# Patient Record
Sex: FEMALE | Race: WHITE | NOT HISPANIC OR LATINO | Employment: UNEMPLOYED | URBAN - METROPOLITAN AREA
[De-identification: names, ages, dates, MRNs, and addresses within clinical notes are randomized per-mention and may not be internally consistent; named-entity substitution may affect disease eponyms.]

---

## 2019-04-08 DIAGNOSIS — Z34.81 ENCOUNTER FOR SUPERVISION OF OTHER NORMAL PREGNANCY IN FIRST TRIMESTER: Primary | ICD-10-CM

## 2019-04-10 ENCOUNTER — PROCEDURE VISIT (OUTPATIENT)
Dept: OBSTETRICS AND GYNECOLOGY | Facility: CLINIC | Age: 24
End: 2019-04-10
Payer: MEDICAID

## 2019-04-10 DIAGNOSIS — Z34.81 ENCOUNTER FOR SUPERVISION OF OTHER NORMAL PREGNANCY IN FIRST TRIMESTER: ICD-10-CM

## 2019-04-10 LAB
AMPHETAMINES (500): NEGATIVE NG/ML
BARBITURATES (200): NEGATIVE NG/ML
BENZODIAZEPINES (300): NEGATIVE NG/ML
COCAINE (150): NEGATIVE NG/ML
MARIJUANA, THC (50): NEGATIVE NG/ML
OPIATES (2000): NEGATIVE NG/ML
PH: 7.6 (ref 4.5–8)
PHENCYCLIDINE (25): NEGATIVE NG/ML
PROPOXYPHENE (300): NEGATIVE NG/ML
URINE CREATININE D/S: 128.2 MG/DL

## 2019-04-10 PROCEDURE — 76801 OB US < 14 WKS SINGLE FETUS: CPT | Mod: S$GLB,,, | Performed by: OBSTETRICS & GYNECOLOGY

## 2019-04-10 PROCEDURE — 76801 PR US, OB <14WKS, TRANSABD, SINGLE GESTATION: ICD-10-PCS | Mod: S$GLB,,, | Performed by: OBSTETRICS & GYNECOLOGY

## 2019-04-17 VITALS — HEIGHT: 60 IN

## 2019-04-18 ENCOUNTER — ROUTINE PRENATAL (OUTPATIENT)
Dept: OBSTETRICS AND GYNECOLOGY | Facility: CLINIC | Age: 24
End: 2019-04-18
Payer: MEDICAID

## 2019-04-18 VITALS
DIASTOLIC BLOOD PRESSURE: 67 MMHG | SYSTOLIC BLOOD PRESSURE: 103 MMHG | WEIGHT: 149 LBS | BODY MASS INDEX: 29.1 KG/M2 | HEART RATE: 73 BPM

## 2019-04-18 DIAGNOSIS — Z34.81 ENCOUNTER FOR SUPERVISION OF OTHER NORMAL PREGNANCY IN FIRST TRIMESTER: ICD-10-CM

## 2019-04-18 DIAGNOSIS — R82.79 ABNORMAL FINDINGS ON MICROBIOLOGICAL EXAMINATION OF URINE: Primary | ICD-10-CM

## 2019-04-18 PROCEDURE — 99204 PR OFFICE/OUTPT VISIT, NEW, LEVL IV, 45-59 MIN: ICD-10-PCS | Mod: TH,S$GLB,, | Performed by: OBSTETRICS & GYNECOLOGY

## 2019-04-18 PROCEDURE — 99204 OFFICE O/P NEW MOD 45 MIN: CPT | Mod: TH,S$GLB,, | Performed by: OBSTETRICS & GYNECOLOGY

## 2019-04-18 NOTE — PROGRESS NOTES
Subjective:       Patient ID: Ricarda Daugherty is a 24 y.o.  at 9w3d   Chief Complaint:  Initial Prenatal Visit      History of Present Illness  here for new ob exam.  Labs and history were reviewed with the patient today  No complaints      OB History  OB History    Para Term  AB Living   3 2 2     2   SAB TAB Ectopic Multiple Live Births           2      # Outcome Date GA Lbr Abilio/2nd Weight Sex Delivery Anes PTL Lv   3 Current            2 Term 18 39w2d  3.374 kg (7 lb 7 oz) F    MARICARMEN   1 Term    3.204 kg (7 lb 1 oz) M Vag-Spont   MARICARMEN       Review of Systems  nml 1st trimester sx- sob, dec excercixe tolerance, fatigue and nausea  Neg for vag bleed, dc, vomiting, cp, lof, fever, chills, ns, visual changes, swelling, headaches, constipation/diarrhea, dysuria, freq/urgency of urination     Objective:     Vitals:    19 1518   BP: 103/67   Pulse: 73       NAD  NCAT  pupils normal size  Skin nml no rashes or lesions  No resp distress, resp even and unlabored  nt nd, no rebound no guarding  nml ext fem gent, normal cvx uterus and adnexa, no dc or bleeding  nml appearing rectum  No cyanosis or clubbing, edema appropriate for pregn    Uterus size approp for gest age         Assessment:        1. Abnormal findings on microbiological examination of urine    2. Encounter for supervision of other normal pregnancy in first trimester               Plan:      Abnormal findings on microbiological examination of urine  -     Urine culture    Encounter for supervision of other normal pregnancy in first trimester  -     Urine culture  -     Type & Screen; Future; Expected date: 2019  -     HIV 1/2 Ag/Ab (4th Gen); Future; Expected date: 2019  -     Treponema pallidum (syphillis) antibody; Future; Expected date: 2019  -     Hepatitis B surface antigen; Future; Expected date: 2019  -     CBC auto differential; Future; Expected date: 2019  -     RUBELLA ANTIBODY SCREEN;  Future; Expected date: 04/18/2019         PNLs today   Pain fever bleeding precautions  Encouraged PNV  rtc 4 wks

## 2019-04-19 LAB — URINE CULTURE, ROUTINE: NORMAL

## 2019-04-25 LAB
CHLAMYDIA: POSITIVE
GONORRHEA: NEGATIVE
SOURCE: ABNORMAL
SOURCE: NORMAL
TRICHOMONAS AMPLIFIED: NEGATIVE

## 2019-05-06 DIAGNOSIS — A74.9 CHLAMYDIA INFECTION: Primary | ICD-10-CM

## 2019-05-06 NOTE — TELEPHONE ENCOUNTER
Pt notified of results and need for partner treatment. Pt verbalizes understanding. Script sent to pharm.

## 2019-05-06 NOTE — TELEPHONE ENCOUNTER
----- Message from Pallavi Tafoya MD sent at 5/2/2019  4:28 PM CDT -----  Please call the patient regarding her abnormal result. zithromax 1g PO once

## 2019-05-07 RX ORDER — AZITHROMYCIN 500 MG/1
TABLET, FILM COATED ORAL
Qty: 2 TABLET | Refills: 0 | Status: SHIPPED | OUTPATIENT
Start: 2019-05-07 | End: 2019-07-22 | Stop reason: SDUPTHER

## 2019-05-24 ENCOUNTER — ROUTINE PRENATAL (OUTPATIENT)
Dept: OBSTETRICS AND GYNECOLOGY | Facility: CLINIC | Age: 24
End: 2019-05-24
Payer: MEDICAID

## 2019-05-24 VITALS
HEART RATE: 67 BPM | SYSTOLIC BLOOD PRESSURE: 108 MMHG | WEIGHT: 150 LBS | DIASTOLIC BLOOD PRESSURE: 77 MMHG | BODY MASS INDEX: 29.29 KG/M2

## 2019-05-24 DIAGNOSIS — Z34.82 ENCOUNTER FOR SUPERVISION OF OTHER NORMAL PREGNANCY IN SECOND TRIMESTER: Primary | ICD-10-CM

## 2019-05-24 PROCEDURE — 99213 PR OFFICE/OUTPT VISIT, EST, LEVL III, 20-29 MIN: ICD-10-PCS | Mod: TH,S$GLB,, | Performed by: OBSTETRICS & GYNECOLOGY

## 2019-05-24 PROCEDURE — 99213 OFFICE O/P EST LOW 20 MIN: CPT | Mod: TH,S$GLB,, | Performed by: OBSTETRICS & GYNECOLOGY

## 2019-05-24 NOTE — PROGRESS NOTES
Subjective:       Patient ID: Ricarda Daugherty is a 24 y.o.  at 14w4d      Chief Complaint:  Routine Prenatal Visit      History of Present Illness  No complaints. Labs and history reviewed with pt.       Review of Systems  Denies n/v, f/c, dysuria, contractions,   VD, VB, round ligament pain, headaches       Objective:     Vitals:    19 1149   BP: 108/77   Pulse: 67     Wt Readings from Last 3 Encounters:   19 68 kg (150 lb)   19 67.6 kg (149 lb)        nad  NCAT  pupils normal size  Skin nml no rashes or lesions  No resp distress, resp even and unlabored  Gravid nt, no rebound no guarding  No cyanosis or clubbing, edema appropriate for pregn    FHT: 140s         Assessment:      No diagnosis found.            Plan:         Prenatal testing - quad on RTC   Encouraged PNV  Pain, fever, bleeding precautions   RTC 4 weeks

## 2019-06-21 ENCOUNTER — ROUTINE PRENATAL (OUTPATIENT)
Dept: OBSTETRICS AND GYNECOLOGY | Facility: CLINIC | Age: 24
End: 2019-06-21
Payer: MEDICAID

## 2019-06-21 VITALS
HEART RATE: 80 BPM | SYSTOLIC BLOOD PRESSURE: 113 MMHG | BODY MASS INDEX: 29.29 KG/M2 | WEIGHT: 150 LBS | DIASTOLIC BLOOD PRESSURE: 72 MMHG

## 2019-06-21 DIAGNOSIS — Z34.82 ENCOUNTER FOR SUPERVISION OF OTHER NORMAL PREGNANCY IN SECOND TRIMESTER: Primary | ICD-10-CM

## 2019-06-21 PROCEDURE — 99213 PR OFFICE/OUTPT VISIT, EST, LEVL III, 20-29 MIN: ICD-10-PCS | Mod: TH,S$GLB,, | Performed by: OBSTETRICS & GYNECOLOGY

## 2019-06-21 PROCEDURE — 99213 OFFICE O/P EST LOW 20 MIN: CPT | Mod: TH,S$GLB,, | Performed by: OBSTETRICS & GYNECOLOGY

## 2019-06-21 NOTE — PROGRESS NOTES
Subjective:       Patient ID: Ricarda Daugherty is a 24 y.o.  at 18w4d      Chief Complaint:  Routine Prenatal Visit      History of Present Illness  No complaints. Labs and history reviewed with pt.       Review of Systems  Denies n/v, f/c, dysuria, contractions,   VD, VB, round ligament pain, headaches       Objective:     Vitals:    19 1026   BP: 113/72   Pulse: 80     Wt Readings from Last 3 Encounters:   19 68 kg (150 lb)   19 68 kg (150 lb)   19 67.6 kg (149 lb)        nad  NCAT  pupils normal size  Skin nml no rashes or lesions  No resp distress, resp even and unlabored  Gravid nt, no rebound no guarding  No cyanosis or clubbing, edema appropriate for pregn    FHT: 140s         Assessment:      No diagnosis found.            Plan:         Quad today   Encouraged PNV  Pain, fever, bleeding precautions   RTC 4 weeks

## 2019-06-24 LAB
# FETUSES US: NORMAL
AFP MOM: 0.73
AFP SERPL-MCNC: 34 NG/ML
AGE: 23.9 YR
CALC'D GESTATIONAL AGE: NORMAL
ESTIMATED DUE DATE: NORMAL
ESTRIOL MOM: 1.33
ESTRIOL: 2.2 NG/ML
FAMILY HISTORY OF ANEUPLOIDY: NO
FAMILY HISTORY OF NTD: NO
GA METHOD: NORMAL
HCG SERPL-ACNC: NORMAL IU/L
IDDM: NO
INHIBIN A SERPL-MCNC: 243 PG/ML
INTEGRATED SCN PATIENT-IMP: NORMAL
Lab: 1.8
M.O.M. INHIBIN A: 1.52
MOTHER'S RACE: NORMAL
REPORT: NORMAL
SMOKER?: NO
SPECIMEN DRAWN SERPL: NORMAL
WEIGHT: NORMAL

## 2019-07-19 ENCOUNTER — ROUTINE PRENATAL (OUTPATIENT)
Dept: OBSTETRICS AND GYNECOLOGY | Facility: CLINIC | Age: 24
End: 2019-07-19
Payer: MEDICAID

## 2019-07-19 VITALS
DIASTOLIC BLOOD PRESSURE: 71 MMHG | SYSTOLIC BLOOD PRESSURE: 108 MMHG | WEIGHT: 156.81 LBS | BODY MASS INDEX: 30.62 KG/M2 | HEART RATE: 89 BPM

## 2019-07-19 DIAGNOSIS — Z34.82 ENCOUNTER FOR SUPERVISION OF OTHER NORMAL PREGNANCY IN SECOND TRIMESTER: Primary | ICD-10-CM

## 2019-07-19 NOTE — PROGRESS NOTES
Subjective:       Patient ID: Ricarda Daugherty is a 24 y.o.  at 22w4d      Chief Complaint:  F/u OB     History of Present Illness  No complaints. Labs and history reviewed with pt.       Review of Systems  Denies n/v, f/c, dysuria, contractions,   VD, VB, round ligament pain, headaches       Objective:     Vitals:    19 0950   BP: 108/71   Pulse: 89     Wt Readings from Last 3 Encounters:   19 71.1 kg (156 lb 12.8 oz)   19 68 kg (150 lb)   19 68 kg (150 lb)        nad  NCAT  pupils normal size  Skin nml no rashes or lesions  No resp distress, resp even and unlabored  Gravid nt, no rebound no guarding  No cyanosis or clubbing, edema appropriate for pregn    FHT: 140s         Assessment:      No diagnosis found.            Plan:         Anatomy today WNL   Encouraged PNV  Pain, fever, bleeding precautions   RTC 4 weeks

## 2019-07-22 DIAGNOSIS — A74.9 CHLAMYDIA INFECTION: ICD-10-CM

## 2019-07-22 DIAGNOSIS — A74.9 CHLAMYDIA INFECTION: Primary | ICD-10-CM

## 2019-07-22 RX ORDER — AZITHROMYCIN 500 MG/1
TABLET, FILM COATED ORAL
Qty: 4 TABLET | Refills: 0 | Status: SHIPPED | OUTPATIENT
Start: 2019-07-22 | End: 2021-01-12

## 2019-07-23 DIAGNOSIS — A74.9 CHLAMYDIA INFECTION: ICD-10-CM

## 2019-07-23 RX ORDER — AZITHROMYCIN 500 MG/1
TABLET, FILM COATED ORAL
Qty: 4 TABLET | Refills: 0 | Status: CANCELLED | OUTPATIENT
Start: 2019-07-23

## 2019-08-09 ENCOUNTER — ROUTINE PRENATAL (OUTPATIENT)
Dept: OBSTETRICS AND GYNECOLOGY | Facility: CLINIC | Age: 24
End: 2019-08-09
Payer: MEDICAID

## 2019-08-09 VITALS
BODY MASS INDEX: 30.27 KG/M2 | WEIGHT: 155 LBS | HEART RATE: 85 BPM | SYSTOLIC BLOOD PRESSURE: 112 MMHG | DIASTOLIC BLOOD PRESSURE: 74 MMHG

## 2019-08-09 DIAGNOSIS — Z34.82 ENCOUNTER FOR SUPERVISION OF OTHER NORMAL PREGNANCY IN SECOND TRIMESTER: Primary | ICD-10-CM

## 2019-08-09 PROCEDURE — 99213 OFFICE O/P EST LOW 20 MIN: CPT | Mod: TH,S$GLB,, | Performed by: OBSTETRICS & GYNECOLOGY

## 2019-08-09 PROCEDURE — 99213 PR OFFICE/OUTPT VISIT, EST, LEVL III, 20-29 MIN: ICD-10-PCS | Mod: TH,S$GLB,, | Performed by: OBSTETRICS & GYNECOLOGY

## 2019-08-09 NOTE — PROGRESS NOTES
Subjective:       Patient ID: Ricarda Daugherty is a 24 y.o.  at 25w4d      Chief Complaint:  F/u OB     History of Present Illness  No complaints. Labs and history reviewed with pt.       Review of Systems  Denies n/v, f/c, dysuria, contractions,   VD, VB, round ligament pain, headaches       Objective:     Vitals:    19 1023   BP: 112/74   Pulse: 85     Wt Readings from Last 3 Encounters:   19 70.3 kg (155 lb)   19 71.1 kg (156 lb 12.8 oz)   19 68 kg (150 lb)        nad  NCAT  pupils normal size  Skin nml no rashes or lesions  No resp distress, resp even and unlabored  Gravid nt, no rebound no guarding  No cyanosis or clubbing, edema appropriate for pregn    FHT: 140s         Assessment:        1. Encounter for supervision of other normal pregnancy in second trimester                Plan:           Encouraged PNV  Pain, fever, bleeding precautions   RTC 4 weeks

## 2019-09-09 ENCOUNTER — ROUTINE PRENATAL (OUTPATIENT)
Dept: OBSTETRICS AND GYNECOLOGY | Facility: CLINIC | Age: 24
End: 2019-09-09
Payer: MEDICAID

## 2019-09-09 VITALS — DIASTOLIC BLOOD PRESSURE: 73 MMHG | BODY MASS INDEX: 31.25 KG/M2 | SYSTOLIC BLOOD PRESSURE: 107 MMHG | WEIGHT: 160 LBS

## 2019-09-09 DIAGNOSIS — Z34.83 ENCOUNTER FOR SUPERVISION OF OTHER NORMAL PREGNANCY IN THIRD TRIMESTER: Primary | ICD-10-CM

## 2019-09-09 DIAGNOSIS — Z34.82 ENCOUNTER FOR SUPERVISION OF OTHER NORMAL PREGNANCY IN SECOND TRIMESTER: Primary | ICD-10-CM

## 2019-09-09 DIAGNOSIS — O26.843 UTERINE SIZE-DATE DISCREPANCY IN THIRD TRIMESTER: ICD-10-CM

## 2019-09-09 PROCEDURE — 90715 TDAP VACCINE GREATER THAN OR EQUAL TO 7YO IM: ICD-10-PCS | Mod: SL,S$GLB,ICN, | Performed by: OBSTETRICS & GYNECOLOGY

## 2019-09-09 PROCEDURE — 99213 PR OFFICE/OUTPT VISIT, EST, LEVL III, 20-29 MIN: ICD-10-PCS | Mod: TH,25,S$GLB, | Performed by: OBSTETRICS & GYNECOLOGY

## 2019-09-09 PROCEDURE — 90471 PR IMMUNIZ ADMIN,1 SINGLE/COMB VAC/TOXOID: ICD-10-PCS | Mod: S$GLB,ICN,, | Performed by: OBSTETRICS & GYNECOLOGY

## 2019-09-09 PROCEDURE — 76815 PR  US,PREGNANT UTERUS,LIMITED, 1/> FETUSES: ICD-10-PCS | Mod: S$GLB,,, | Performed by: OBSTETRICS & GYNECOLOGY

## 2019-09-09 PROCEDURE — 76815 OB US LIMITED FETUS(S): CPT | Mod: S$GLB,,, | Performed by: OBSTETRICS & GYNECOLOGY

## 2019-09-09 PROCEDURE — 90471 IMMUNIZATION ADMIN: CPT | Mod: S$GLB,ICN,, | Performed by: OBSTETRICS & GYNECOLOGY

## 2019-09-09 PROCEDURE — 99213 OFFICE O/P EST LOW 20 MIN: CPT | Mod: TH,25,S$GLB, | Performed by: OBSTETRICS & GYNECOLOGY

## 2019-09-09 PROCEDURE — 90715 TDAP VACCINE 7 YRS/> IM: CPT | Mod: SL,S$GLB,ICN, | Performed by: OBSTETRICS & GYNECOLOGY

## 2019-09-09 NOTE — PROGRESS NOTES
Subjective:       Patient ID: Ricarda Daugherty is a 24 y.o.  at 30w0d      Chief Complaint:  F/u OB     History of Present Illness  No complaints. Labs and history reviewed with pt.       Review of Systems  Denies n/v, f/c, dysuria, contractions,   VD, VB, round ligament pain, headaches       Objective:     Vitals:    19 1415   BP: 107/73     Wt Readings from Last 3 Encounters:   19 72.6 kg (160 lb)   19 70.3 kg (155 lb)   19 71.1 kg (156 lb 12.8 oz)        nad  NCAT  pupils normal size  Skin nml no rashes or lesions  No resp distress, resp even and unlabored  Gravid nt, no rebound no guarding  No cyanosis or clubbing, edema appropriate for pregn    FHT: 140s  FH: 26cm        Assessment:        1. Encounter for supervision of other normal pregnancy in second trimester                Plan:      Growth today - 1357g 26%  Tdap, 3rd tri, o'sul today   Encouraged PNV  Pain, fever, bleeding precautions   RTC 4 weeks

## 2019-09-10 LAB — GLUCOSE 1 HR POST 50 GM: 101 MG/DL

## 2019-09-24 ENCOUNTER — ROUTINE PRENATAL (OUTPATIENT)
Dept: OBSTETRICS AND GYNECOLOGY | Facility: CLINIC | Age: 24
End: 2019-09-24
Payer: MEDICAID

## 2019-09-24 VITALS
SYSTOLIC BLOOD PRESSURE: 101 MMHG | HEART RATE: 91 BPM | BODY MASS INDEX: 31.44 KG/M2 | DIASTOLIC BLOOD PRESSURE: 64 MMHG | WEIGHT: 161 LBS

## 2019-09-24 DIAGNOSIS — Z34.83 ENCOUNTER FOR SUPERVISION OF OTHER NORMAL PREGNANCY IN THIRD TRIMESTER: Primary | ICD-10-CM

## 2019-09-24 PROCEDURE — 99213 PR OFFICE/OUTPT VISIT, EST, LEVL III, 20-29 MIN: ICD-10-PCS | Mod: TH,S$GLB,, | Performed by: OBSTETRICS & GYNECOLOGY

## 2019-09-24 PROCEDURE — 99213 OFFICE O/P EST LOW 20 MIN: CPT | Mod: TH,S$GLB,, | Performed by: OBSTETRICS & GYNECOLOGY

## 2019-09-24 NOTE — PROGRESS NOTES
Subjective:       Patient ID: Ricarda Daugherty is a 24 y.o.  at 32w1d      Chief Complaint:  F/u OB     History of Present Illness  No complaints. Labs and history reviewed with pt.       Review of Systems  Denies n/v, f/c, dysuria, contractions,   VD, VB, round ligament pain, headaches       Objective:     Vitals:    19 1101   BP: 101/64   Pulse: 91     Wt Readings from Last 3 Encounters:   19 73 kg (161 lb)   19 72.6 kg (160 lb)   19 70.3 kg (155 lb)        nad  NCAT  pupils normal size  Skin nml no rashes or lesions  No resp distress, resp even and unlabored  Gravid nt, no rebound no guarding  No cyanosis or clubbing, edema appropriate for pregn    FHT: 140s  FH: 30cm        Assessment:        1. Encounter for supervision of other normal pregnancy in third trimester                Plan:        Encouraged PNV  Pain, fever, bleeding precautions   RTC 4 weeks

## 2019-09-30 LAB
ABS NRBC COUNT: 0 X 10 3/UL (ref 0–0.01)
ABSOLUTE BASOPHIL: 0.01 X 10 3/UL (ref 0–0.22)
ABSOLUTE EOSINOPHIL: 0.2 X 10 3/UL (ref 0.04–0.54)
ABSOLUTE IMMATURE GRAN: 0.02 X 10 3/UL (ref 0–0.04)
ABSOLUTE LYMPHOCYTE: 1.04 X 10 3/UL (ref 0.86–4.75)
ABSOLUTE MONOCYTE: 0.5 X 10 3/UL (ref 0.22–1.08)
BASOPHILS NFR BLD: 0.2 % (ref 0.2–1.2)
EOSINOPHIL NFR BLD: 3.1 % (ref 0.7–7)
HCT VFR BLD AUTO: 31.5 % (ref 37–47)
HGB BLD-MCNC: 9.7 G/DL (ref 12–16)
IMMATURE GRANULOCYTES: 0.3 % (ref 0–0.5)
LYMPHOCYTES NFR BLD: 16 % (ref 19.3–53.1)
MCH RBC QN AUTO: 25.7 PG (ref 27–32)
MCHC RBC AUTO-ENTMCNC: 30.8 G/DL (ref 32–36)
MCV RBC AUTO: 83.3 FL (ref 82–100)
MONOCYTES NFR BLD: 7.7 % (ref 4.7–12.5)
NEUTROPHILS ABSOLUTE COUNT: 4.71 X 10 3/UL (ref 2.15–7.56)
NEUTROPHILS NFR BLD: 72.7 %
NUCLEATED RED BLOOD CELLS: 0 /100 WBC (ref 0–0.2)
PLATELET # BLD AUTO: 261 X 10 3/UL (ref 135–400)
RBC # BLD AUTO: 3.78 X 10 6/UL (ref 4.2–5.4)
RDW-SD: 43.4 FL (ref 37–54)
WBC # BLD: 6.48 X 10 3/UL (ref 4.3–10.8)

## 2019-10-03 DIAGNOSIS — O99.013 ANEMIA DURING PREGNANCY IN THIRD TRIMESTER: Primary | ICD-10-CM

## 2019-10-03 RX ORDER — FERROUS SULFATE 325(65) MG
325 TABLET, DELAYED RELEASE (ENTERIC COATED) ORAL 3 TIMES DAILY
Qty: 90 TABLET | Refills: 3 | Status: SHIPPED | OUTPATIENT
Start: 2019-10-03 | End: 2020-01-31

## 2019-10-03 RX ORDER — ASCORBIC ACID 500 MG
500 TABLET ORAL 2 TIMES DAILY
Qty: 60 TABLET | Refills: 3 | Status: SHIPPED | OUTPATIENT
Start: 2019-10-03 | End: 2020-01-31

## 2019-10-03 NOTE — TELEPHONE ENCOUNTER
----- Message from Pallavi Tafoya MD sent at 10/2/2019  9:19 AM CDT -----  Please call the patient regarding her abnormal result. Call in Iron TID, vit C BID

## 2019-10-08 ENCOUNTER — ROUTINE PRENATAL (OUTPATIENT)
Dept: OBSTETRICS AND GYNECOLOGY | Facility: CLINIC | Age: 24
End: 2019-10-08
Payer: MEDICAID

## 2019-10-08 VITALS
DIASTOLIC BLOOD PRESSURE: 70 MMHG | HEART RATE: 93 BPM | WEIGHT: 160.38 LBS | SYSTOLIC BLOOD PRESSURE: 122 MMHG | BODY MASS INDEX: 31.33 KG/M2

## 2019-10-08 DIAGNOSIS — Z34.83 ENCOUNTER FOR SUPERVISION OF OTHER NORMAL PREGNANCY IN THIRD TRIMESTER: Primary | ICD-10-CM

## 2019-10-08 PROCEDURE — 90471 IMMUNIZATION ADMIN: CPT | Mod: S$GLB,VFC,, | Performed by: OBSTETRICS & GYNECOLOGY

## 2019-10-08 PROCEDURE — 99213 OFFICE O/P EST LOW 20 MIN: CPT | Mod: 25,TH,S$GLB, | Performed by: OBSTETRICS & GYNECOLOGY

## 2019-10-08 PROCEDURE — 90686 FLU VACCINE (QUAD) GREATER THAN OR EQUAL TO 3YO PRESERVATIVE FREE IM: ICD-10-PCS | Mod: SL,S$GLB,, | Performed by: OBSTETRICS & GYNECOLOGY

## 2019-10-08 PROCEDURE — 90686 IIV4 VACC NO PRSV 0.5 ML IM: CPT | Mod: SL,S$GLB,, | Performed by: OBSTETRICS & GYNECOLOGY

## 2019-10-08 PROCEDURE — 99213 PR OFFICE/OUTPT VISIT, EST, LEVL III, 20-29 MIN: ICD-10-PCS | Mod: 25,TH,S$GLB, | Performed by: OBSTETRICS & GYNECOLOGY

## 2019-10-08 PROCEDURE — 90471 FLU VACCINE (QUAD) GREATER THAN OR EQUAL TO 3YO PRESERVATIVE FREE IM: ICD-10-PCS | Mod: S$GLB,VFC,, | Performed by: OBSTETRICS & GYNECOLOGY

## 2019-10-08 NOTE — PROGRESS NOTES
Subjective:       Patient ID: Ricarda Daugherty is a 24 y.o.  at 34w1d      Chief Complaint:  F/u OB     History of Present Illness  No complaints. Labs and history reviewed with pt.       Review of Systems  Denies n/v, f/c, dysuria, contractions,   VD, VB, round ligament pain, headaches       Objective:     Vitals:    10/08/19 1122   BP: 122/70   Pulse: 93     Wt Readings from Last 3 Encounters:   10/08/19 72.8 kg (160 lb 6.4 oz)   19 73 kg (161 lb)   19 72.6 kg (160 lb)        nad  NCAT  pupils normal size  Skin nml no rashes or lesions  No resp distress, resp even and unlabored  Gravid nt, no rebound no guarding  No cyanosis or clubbing, edema appropriate for pregn    FHT: 140s  FH: 30cm        Assessment:        1. Encounter for supervision of other normal pregnancy in third trimester                Plan:      Flu shot today   Encouraged PNV  Pain, fever, bleeding precautions   RTC 2 weeks

## 2019-10-24 ENCOUNTER — ROUTINE PRENATAL (OUTPATIENT)
Dept: OBSTETRICS AND GYNECOLOGY | Facility: CLINIC | Age: 24
End: 2019-10-24
Payer: MEDICAID

## 2019-10-24 VITALS
HEART RATE: 82 BPM | DIASTOLIC BLOOD PRESSURE: 75 MMHG | SYSTOLIC BLOOD PRESSURE: 115 MMHG | WEIGHT: 164 LBS | BODY MASS INDEX: 32.03 KG/M2

## 2019-10-24 DIAGNOSIS — Z34.83 ENCOUNTER FOR SUPERVISION OF OTHER NORMAL PREGNANCY IN THIRD TRIMESTER: Primary | ICD-10-CM

## 2019-10-24 LAB — AMNISURE ROM QUALITATIVE: NORMAL

## 2019-10-24 PROCEDURE — 99213 OFFICE O/P EST LOW 20 MIN: CPT | Mod: TH,S$GLB,, | Performed by: OBSTETRICS & GYNECOLOGY

## 2019-10-24 PROCEDURE — 99213 PR OFFICE/OUTPT VISIT, EST, LEVL III, 20-29 MIN: ICD-10-PCS | Mod: TH,S$GLB,, | Performed by: OBSTETRICS & GYNECOLOGY

## 2019-10-24 NOTE — PROGRESS NOTES
Subjective:       Patient ID: Ricarda Daugherty is a 24 y.o.  at 36w3d      Chief Complaint:  F/u OB     History of Present Illness  No complaints. Labs and history reviewed with pt.       Review of Systems  Denies n/v, f/c, dysuria, contractions,   VD, VB, round ligament pain, headaches       Objective:     Vitals:    10/24/19 1109   BP: 115/75   Pulse: 82     Wt Readings from Last 3 Encounters:   10/24/19 74.4 kg (164 lb)   10/08/19 72.8 kg (160 lb 6.4 oz)   19 73 kg (161 lb)        nad  NCAT  pupils normal size  Skin nml no rashes or lesions  No resp distress, resp even and unlabored  Gravid nt, no rebound no guarding  No cyanosis or clubbing, edema appropriate for pregn    FHT: 140s  FH: 35cm        Assessment:        1. Encounter for supervision of other normal pregnancy in third trimester                Plan:      GBS today   Encouraged PNV  Pain, fever, bleeding precautions   RTC 2 weeks

## 2019-10-25 LAB
GROUP B STREP MOLECULAR: NEGATIVE
PENICILLIN ALLERGIC: NO

## 2019-10-30 ENCOUNTER — ROUTINE PRENATAL (OUTPATIENT)
Dept: OBSTETRICS AND GYNECOLOGY | Facility: CLINIC | Age: 24
End: 2019-10-30
Payer: MEDICAID

## 2019-10-30 VITALS
WEIGHT: 161 LBS | HEART RATE: 84 BPM | BODY MASS INDEX: 31.44 KG/M2 | SYSTOLIC BLOOD PRESSURE: 106 MMHG | DIASTOLIC BLOOD PRESSURE: 71 MMHG

## 2019-10-30 DIAGNOSIS — Z34.83 ENCOUNTER FOR SUPERVISION OF OTHER NORMAL PREGNANCY IN THIRD TRIMESTER: Primary | ICD-10-CM

## 2019-10-30 DIAGNOSIS — O26.843 UTERINE SIZE-DATE DISCREPANCY IN THIRD TRIMESTER: ICD-10-CM

## 2019-10-30 PROCEDURE — 99213 OFFICE O/P EST LOW 20 MIN: CPT | Mod: TH,25,S$GLB, | Performed by: OBSTETRICS & GYNECOLOGY

## 2019-10-30 PROCEDURE — 76815 OB US LIMITED FETUS(S): CPT | Mod: S$GLB,,, | Performed by: OBSTETRICS & GYNECOLOGY

## 2019-10-30 PROCEDURE — 76815 PR  US,PREGNANT UTERUS,LIMITED, 1/> FETUSES: ICD-10-PCS | Mod: S$GLB,,, | Performed by: OBSTETRICS & GYNECOLOGY

## 2019-10-30 PROCEDURE — 99213 PR OFFICE/OUTPT VISIT, EST, LEVL III, 20-29 MIN: ICD-10-PCS | Mod: TH,25,S$GLB, | Performed by: OBSTETRICS & GYNECOLOGY

## 2019-10-30 NOTE — PROGRESS NOTES
Subjective:       Patient ID: Ricarda Daugherty is a 24 y.o.  at 37w2d      Chief Complaint:  F/u OB     History of Present Illness  No complaints. Labs and history reviewed with pt.       Review of Systems  Denies n/v, f/c, dysuria, contractions,   VD, VB, round ligament pain, headaches       Objective:     Vitals:    10/30/19 1309   BP: 106/71   Pulse: 84     Wt Readings from Last 3 Encounters:   10/30/19 73 kg (161 lb)   10/24/19 74.4 kg (164 lb)   10/08/19 72.8 kg (160 lb 6.4 oz)        nad  NCAT  pupils normal size  Skin nml no rashes or lesions  No resp distress, resp even and unlabored  Gravid nt, no rebound no guarding  No cyanosis or clubbing, edema appropriate for pregn    FHT: 140s  FH: 35cm        Assessment:        1. Uterine size-date discrepancy in third trimester                Plan:      Growth today 20%tile  Encouraged PNV  Pain, fever, bleeding precautions   RTC 1 weeks

## 2019-11-02 LAB
APPEARANCE, UA: CLEAR
BACTERIA SPEC CULT: ABNORMAL /HPF
BILIRUB UR QL STRIP: NEGATIVE MG/DL
COLOR UR: ABNORMAL
GLUCOSE (UA): NORMAL MG/DL
HGB UR QL STRIP: NEGATIVE /UL
KETONES UR QL STRIP: 150 MG/DL
LEUKOCYTE ESTERASE UR QL STRIP: 100 /UL
NITRITE UR QL STRIP: NEGATIVE
PH UR STRIP: 7 PH (ref 5–9)
PROT UR QL STRIP: NEGATIVE MG/DL
RBC #/AREA URNS HPF: ABNORMAL /HPF (ref 0–2)
SERVICE COMMENT 03: ABNORMAL
SP GR UR STRIP: 1 (ref 1–1.03)
SPECIMEN COLLECTION METHOD, URINE: ABNORMAL
SQUAMOUS EPITHELIAL, UA: ABNORMAL /LPF
UROBILINOGEN UR STRIP-ACNC: NORMAL MG/DL
WBC #/AREA URNS HPF: ABNORMAL /HPF (ref 0–5)

## 2019-12-16 ENCOUNTER — POSTPARTUM VISIT (OUTPATIENT)
Dept: OBSTETRICS AND GYNECOLOGY | Facility: CLINIC | Age: 24
End: 2019-12-16
Payer: MEDICAID

## 2019-12-16 VITALS
BODY MASS INDEX: 30.63 KG/M2 | SYSTOLIC BLOOD PRESSURE: 119 MMHG | WEIGHT: 156 LBS | DIASTOLIC BLOOD PRESSURE: 70 MMHG | HEART RATE: 81 BPM | HEIGHT: 60 IN

## 2019-12-16 DIAGNOSIS — Z01.419 WOMEN'S ANNUAL ROUTINE GYNECOLOGICAL EXAMINATION: Primary | ICD-10-CM

## 2019-12-16 PROCEDURE — 59430 PR CARE AFTER DELIVERY ONLY: ICD-10-PCS | Mod: S$GLB,,, | Performed by: OBSTETRICS & GYNECOLOGY

## 2019-12-16 NOTE — PROGRESS NOTES
Subjective:       Patient ID: Ricarda Daugherty is a 24 y.o. female.    Chief Complaint:  Postpartum Care      History of Present Illness  Here for 6 wk pp exam sp .   Complaints none.     Review of Systems  Review of Systems   Constitutional: Negative for activity change, appetite change, chills, diaphoresis and fever.   Respiratory: Negative for shortness of breath.    Cardiovascular: Negative for chest pain.   Gastrointestinal: Negative for abdominal pain, bloating, constipation, nausea and vomiting.   Genitourinary: Negative for dysuria, flank pain, hematuria and menorrhagia.   Integumentary:  Negative for breast mass, breast skin changes and breast tenderness.   Neurological: Negative for headaches.   Psychiatric/Behavioral: Negative for depression. The patient is not nervous/anxious.    Breast: Negative for lump, mass, mastodynia, skin changes and tenderness          Objective:    Physical Exam:   Constitutional: She appears well-developed and well-nourished. No distress.    HENT:   Head: Normocephalic and atraumatic.    Eyes: Conjunctivae and EOM are normal.    Neck: No tracheal deviation present. No thyromegaly present.    Cardiovascular: Exam reveals no clubbing, no cyanosis and no edema.     Pulmonary/Chest: Effort normal. No respiratory distress.        Abdominal: Soft. She exhibits no distension and no mass. There is no tenderness. There is no rebound and no guarding. No hernia.     Genitourinary: Rectum normal, vagina normal and uterus normal. Pelvic exam was performed with patient supine. There is no rash, tenderness, lesion or injury on the right labia. There is no rash, tenderness, lesion or injury on the left labia. Cervix is normal. Right adnexum displays no mass, no tenderness and no fullness. Left adnexum displays no mass, no tenderness and no fullness.                Skin: She is not diaphoretic. No cyanosis. Nails show no clubbing.           Assessment:     Postpartum  Depression screen  nml  bottle feeding     Plan:   rtc for annual or prn  Contraception - declined   Preventative screening utd

## 2020-12-28 DIAGNOSIS — O21.0 MORNING SICKNESS: Primary | ICD-10-CM

## 2020-12-28 RX ORDER — PROMETHAZINE HYDROCHLORIDE 25 MG/1
25 TABLET ORAL EVERY 4 HOURS
Qty: 30 TABLET | Refills: 5 | Status: SHIPPED | OUTPATIENT
Start: 2020-12-28 | End: 2023-01-27

## 2020-12-30 DIAGNOSIS — Z34.90 PREGNANCY, UNSPECIFIED GESTATIONAL AGE: Primary | ICD-10-CM

## 2021-01-05 ENCOUNTER — PROCEDURE VISIT (OUTPATIENT)
Dept: OBSTETRICS AND GYNECOLOGY | Facility: CLINIC | Age: 26
End: 2021-01-05
Payer: MEDICAID

## 2021-01-05 DIAGNOSIS — Z34.90 PREGNANCY, UNSPECIFIED GESTATIONAL AGE: ICD-10-CM

## 2021-01-05 LAB
AMPHETAMINES (500): NEGATIVE NG/ML
BARBITURATES (200): NEGATIVE NG/ML
BENZODIAZEPINES: NEGATIVE NG/ML
COCAINE (150): NEGATIVE NG/ML
MARIJUANA, THC (50): NEGATIVE NG/ML
METHADONE: NEGATIVE NG/ML
OPIATES: NEGATIVE NG/ML
OXYCODONE: NEGATIVE NG/ML
PH: 7 (ref 4.5–8)
PHENCYCLIDINE (25): NEGATIVE NG/ML
URINE CREATININE D/S: 207.1 MG/DL

## 2021-01-05 PROCEDURE — 76801 OB US < 14 WKS SINGLE FETUS: CPT | Mod: S$GLB,,, | Performed by: OBSTETRICS & GYNECOLOGY

## 2021-01-05 PROCEDURE — 76801 PR US, OB <14WKS, TRANSABD, SINGLE GESTATION: ICD-10-PCS | Mod: S$GLB,,, | Performed by: OBSTETRICS & GYNECOLOGY

## 2021-01-06 LAB
ABS NRBC COUNT: 0 X 10 3/UL (ref 0–0.01)
ABSOLUTE BASOPHIL: 0.01 X 10 3/UL (ref 0–0.22)
ABSOLUTE EOSINOPHIL: 0.53 X 10 3/UL (ref 0.04–0.54)
ABSOLUTE IMMATURE GRAN: 0.01 X 10 3/UL (ref 0–0.04)
ABSOLUTE LYMPHOCYTE: 0.93 X 10 3/UL (ref 0.86–4.75)
ABSOLUTE MONOCYTE: 0.34 X 10 3/UL (ref 0.22–1.08)
ANTIBODY SCREEN: NEGATIVE
BASOPHILS NFR BLD: 0.2 % (ref 0.2–1.2)
BLOOD GROUPING: NORMAL
BLOOD TYPE (D): POSITIVE
EOSINOPHIL NFR BLD: 8.1 % (ref 0.7–7)
HBV SURFACE AG SERPL QL IA: NONREACTIVE
HCT VFR BLD AUTO: 38.7 % (ref 37–47)
HCV IGG SERPL QL IA: NONREACTIVE
HGB BLD-MCNC: 12.5 G/DL (ref 12–16)
HIV 1+2 AB+HIV1 P24 AG SERPL QL IA: NONREACTIVE
IMMATURE GRANULOCYTES: 0.2 % (ref 0–0.5)
LYMPHOCYTES NFR BLD: 14.2 % (ref 19.3–53.1)
MCH RBC QN AUTO: 28.3 PG (ref 27–32)
MCHC RBC AUTO-ENTMCNC: 32.3 G/DL (ref 32–36)
MCV RBC AUTO: 87.8 FL (ref 82–100)
MONOCYTES NFR BLD: 5.2 % (ref 4.7–12.5)
NEUTROPHILS # BLD AUTO: 4.75 X 10 3/UL (ref 2.15–7.56)
NEUTROPHILS NFR BLD: 72.1 %
NUCLEATED RED BLOOD CELLS: 0 /100 WBC (ref 0–0.2)
PLATELET # BLD AUTO: 233 X 10 3/UL (ref 135–400)
RBC # BLD AUTO: 4.41 X 10 6/UL (ref 4.2–5.4)
RDW-SD: 43.6 FL (ref 37–54)
RUBELLA IGG SCREEN: NORMAL
SICKLE CELL PREP: NEGATIVE
SYPHILIS TREPONEMAL ANTIBODY: NONREACTIVE
WBC # BLD: 6.57 X 10 3/UL (ref 4.3–10.8)

## 2021-01-12 ENCOUNTER — ROUTINE PRENATAL (OUTPATIENT)
Dept: OBSTETRICS AND GYNECOLOGY | Facility: CLINIC | Age: 26
End: 2021-01-12
Payer: MEDICAID

## 2021-01-12 VITALS
DIASTOLIC BLOOD PRESSURE: 75 MMHG | HEART RATE: 96 BPM | SYSTOLIC BLOOD PRESSURE: 122 MMHG | WEIGHT: 157 LBS | BODY MASS INDEX: 30.66 KG/M2

## 2021-01-12 DIAGNOSIS — Z34.81 ENCOUNTER FOR SUPERVISION OF OTHER NORMAL PREGNANCY IN FIRST TRIMESTER: Primary | ICD-10-CM

## 2021-01-12 PROCEDURE — 99204 OFFICE O/P NEW MOD 45 MIN: CPT | Mod: 25,TH,S$GLB, | Performed by: OBSTETRICS & GYNECOLOGY

## 2021-01-12 PROCEDURE — 90686 FLU VACCINE (QUAD) GREATER THAN OR EQUAL TO 3YO PRESERVATIVE FREE IM: ICD-10-PCS | Mod: S$GLB,,, | Performed by: OBSTETRICS & GYNECOLOGY

## 2021-01-12 PROCEDURE — 90471 IMMUNIZATION ADMIN: CPT | Mod: S$GLB,,, | Performed by: OBSTETRICS & GYNECOLOGY

## 2021-01-12 PROCEDURE — 99204 PR OFFICE/OUTPT VISIT, NEW, LEVL IV, 45-59 MIN: ICD-10-PCS | Mod: 25,TH,S$GLB, | Performed by: OBSTETRICS & GYNECOLOGY

## 2021-01-12 PROCEDURE — 90686 IIV4 VACC NO PRSV 0.5 ML IM: CPT | Mod: S$GLB,,, | Performed by: OBSTETRICS & GYNECOLOGY

## 2021-01-12 PROCEDURE — 90471 FLU VACCINE (QUAD) GREATER THAN OR EQUAL TO 3YO PRESERVATIVE FREE IM: ICD-10-PCS | Mod: S$GLB,,, | Performed by: OBSTETRICS & GYNECOLOGY

## 2021-01-14 LAB
CHLAMYDIA: NEGATIVE
GONORRHEA: NEGATIVE
SOURCE: NORMAL
SOURCE: NORMAL
TRICHOMONAS AMPLIFIED: NEGATIVE

## 2021-01-18 ENCOUNTER — PATIENT MESSAGE (OUTPATIENT)
Dept: OBSTETRICS AND GYNECOLOGY | Facility: CLINIC | Age: 26
End: 2021-01-18

## 2021-01-25 ENCOUNTER — PATIENT MESSAGE (OUTPATIENT)
Dept: ADMINISTRATIVE | Facility: OTHER | Age: 26
End: 2021-01-25

## 2021-02-01 ENCOUNTER — PATIENT MESSAGE (OUTPATIENT)
Dept: ADMINISTRATIVE | Facility: OTHER | Age: 26
End: 2021-02-01

## 2021-02-01 ENCOUNTER — PATIENT MESSAGE (OUTPATIENT)
Dept: OBSTETRICS AND GYNECOLOGY | Facility: CLINIC | Age: 26
End: 2021-02-01

## 2021-02-09 ENCOUNTER — ROUTINE PRENATAL (OUTPATIENT)
Dept: OBSTETRICS AND GYNECOLOGY | Facility: CLINIC | Age: 26
End: 2021-02-09
Payer: MEDICAID

## 2021-02-09 VITALS
DIASTOLIC BLOOD PRESSURE: 77 MMHG | HEART RATE: 74 BPM | BODY MASS INDEX: 31.44 KG/M2 | SYSTOLIC BLOOD PRESSURE: 111 MMHG | WEIGHT: 161 LBS

## 2021-02-09 DIAGNOSIS — Z34.82 ENCOUNTER FOR SUPERVISION OF OTHER NORMAL PREGNANCY IN SECOND TRIMESTER: Primary | ICD-10-CM

## 2021-02-09 PROCEDURE — 99213 OFFICE O/P EST LOW 20 MIN: CPT | Mod: TH,S$GLB,, | Performed by: OBSTETRICS & GYNECOLOGY

## 2021-02-09 PROCEDURE — 99213 PR OFFICE/OUTPT VISIT, EST, LEVL III, 20-29 MIN: ICD-10-PCS | Mod: TH,S$GLB,, | Performed by: OBSTETRICS & GYNECOLOGY

## 2021-03-09 ENCOUNTER — ROUTINE PRENATAL (OUTPATIENT)
Dept: OBSTETRICS AND GYNECOLOGY | Facility: CLINIC | Age: 26
End: 2021-03-09
Payer: MEDICAID

## 2021-03-09 VITALS
SYSTOLIC BLOOD PRESSURE: 114 MMHG | WEIGHT: 160 LBS | DIASTOLIC BLOOD PRESSURE: 67 MMHG | HEART RATE: 70 BPM | BODY MASS INDEX: 31.25 KG/M2

## 2021-03-09 DIAGNOSIS — Z34.82 ENCOUNTER FOR SUPERVISION OF OTHER NORMAL PREGNANCY IN SECOND TRIMESTER: Primary | ICD-10-CM

## 2021-03-09 PROCEDURE — 99213 OFFICE O/P EST LOW 20 MIN: CPT | Mod: TH,S$GLB,, | Performed by: OBSTETRICS & GYNECOLOGY

## 2021-03-09 PROCEDURE — 99213 PR OFFICE/OUTPT VISIT, EST, LEVL III, 20-29 MIN: ICD-10-PCS | Mod: TH,S$GLB,, | Performed by: OBSTETRICS & GYNECOLOGY

## 2021-03-12 LAB
AFP MOM: 1.04
AFP, SERUM: 44.2 NG/ML
AGE RISK DOWN SYNDROME: NORMAL
CALC'D GESTATIONAL AGE: 18.1 WEEKS
CIGARETTE SMOKER: NO
COMMENT: NORMAL
DONOR AGE: EGG RETRIEVAL: NORMAL
DONOR EGG: NO
EDD DETERMINED BY: NORMAL
EST'D DATE OF DELIVERY: NORMAL
ESTRIOL MOM: 2
ESTRIOL, FREE: 2.89 NG/ML
HCG MOM: 0.51
HCG, SERUM: 11.65 IU/ML
HX OF NEURAL TUBE DEFECTS: NO
INHIBIN A MOM: 0.85
INHIBIN A, DIMERIC: 124 PG/ML
INSULIN DEPEND DIABETIC: NO
INTERPRETATION: NORMAL
Lab: NORMAL
MATERNAL WEIGHT: 161 LBS
MOTHER'S ETHNIC ORIGIN: NORMAL
MSS DOWN SYNDROME RISK: NORMAL
MSS TRISOMY 18 RISK: NORMAL
NUMBER OF FETUSES: 1
OTHER ETHNIC INFORMATION: NORMAL
PREV PREGNANCY DOWN SYND: NO
REPEAT SPECIMEN: NO
RISK FOR ONTD: NORMAL

## 2021-03-22 ENCOUNTER — PATIENT MESSAGE (OUTPATIENT)
Dept: OBSTETRICS AND GYNECOLOGY | Facility: CLINIC | Age: 26
End: 2021-03-22

## 2021-03-24 ENCOUNTER — PATIENT MESSAGE (OUTPATIENT)
Dept: OBSTETRICS AND GYNECOLOGY | Facility: CLINIC | Age: 26
End: 2021-03-24

## 2021-04-06 ENCOUNTER — ROUTINE PRENATAL (OUTPATIENT)
Dept: OBSTETRICS AND GYNECOLOGY | Facility: CLINIC | Age: 26
End: 2021-04-06
Payer: MEDICAID

## 2021-04-06 VITALS
WEIGHT: 162 LBS | BODY MASS INDEX: 31.64 KG/M2 | SYSTOLIC BLOOD PRESSURE: 107 MMHG | HEART RATE: 90 BPM | DIASTOLIC BLOOD PRESSURE: 71 MMHG

## 2021-04-06 DIAGNOSIS — Z34.82 ENCOUNTER FOR SUPERVISION OF OTHER NORMAL PREGNANCY IN SECOND TRIMESTER: Primary | ICD-10-CM

## 2021-04-06 PROCEDURE — 99213 OFFICE O/P EST LOW 20 MIN: CPT | Mod: TH,25,S$GLB, | Performed by: OBSTETRICS & GYNECOLOGY

## 2021-04-06 PROCEDURE — 99213 PR OFFICE/OUTPT VISIT, EST, LEVL III, 20-29 MIN: ICD-10-PCS | Mod: TH,25,S$GLB, | Performed by: OBSTETRICS & GYNECOLOGY

## 2021-04-06 PROCEDURE — 76805 OB US >/= 14 WKS SNGL FETUS: CPT | Mod: S$GLB,,, | Performed by: OBSTETRICS & GYNECOLOGY

## 2021-04-06 PROCEDURE — 76805 PR US, OB 14+WKS, TRANSABD, SINGLE GESTATION: ICD-10-PCS | Mod: S$GLB,,, | Performed by: OBSTETRICS & GYNECOLOGY

## 2021-04-26 ENCOUNTER — PATIENT MESSAGE (OUTPATIENT)
Dept: OBSTETRICS AND GYNECOLOGY | Facility: CLINIC | Age: 26
End: 2021-04-26

## 2021-04-27 ENCOUNTER — PATIENT MESSAGE (OUTPATIENT)
Dept: OBSTETRICS AND GYNECOLOGY | Facility: CLINIC | Age: 26
End: 2021-04-27

## 2021-04-28 ENCOUNTER — PATIENT MESSAGE (OUTPATIENT)
Dept: OBSTETRICS AND GYNECOLOGY | Facility: CLINIC | Age: 26
End: 2021-04-28

## 2021-05-04 ENCOUNTER — ROUTINE PRENATAL (OUTPATIENT)
Dept: OBSTETRICS AND GYNECOLOGY | Facility: CLINIC | Age: 26
End: 2021-05-04
Payer: MEDICAID

## 2021-05-04 VITALS
BODY MASS INDEX: 32.61 KG/M2 | WEIGHT: 167 LBS | DIASTOLIC BLOOD PRESSURE: 73 MMHG | SYSTOLIC BLOOD PRESSURE: 113 MMHG | HEART RATE: 99 BPM

## 2021-05-04 DIAGNOSIS — Z34.82 ENCOUNTER FOR SUPERVISION OF OTHER NORMAL PREGNANCY IN SECOND TRIMESTER: Primary | ICD-10-CM

## 2021-05-04 PROCEDURE — 99213 PR OFFICE/OUTPT VISIT, EST, LEVL III, 20-29 MIN: ICD-10-PCS | Mod: TH,S$GLB,, | Performed by: OBSTETRICS & GYNECOLOGY

## 2021-05-04 PROCEDURE — 99213 OFFICE O/P EST LOW 20 MIN: CPT | Mod: TH,S$GLB,, | Performed by: OBSTETRICS & GYNECOLOGY

## 2021-05-17 DIAGNOSIS — Z34.82 ENCOUNTER FOR SUPERVISION OF OTHER NORMAL PREGNANCY IN SECOND TRIMESTER: Primary | ICD-10-CM

## 2021-05-18 ENCOUNTER — ROUTINE PRENATAL (OUTPATIENT)
Dept: OBSTETRICS AND GYNECOLOGY | Facility: CLINIC | Age: 26
End: 2021-05-18
Payer: MEDICAID

## 2021-05-18 VITALS
DIASTOLIC BLOOD PRESSURE: 72 MMHG | BODY MASS INDEX: 32.42 KG/M2 | WEIGHT: 166 LBS | SYSTOLIC BLOOD PRESSURE: 114 MMHG | HEART RATE: 77 BPM

## 2021-05-18 DIAGNOSIS — Z34.82 ENCOUNTER FOR SUPERVISION OF OTHER NORMAL PREGNANCY IN SECOND TRIMESTER: Primary | ICD-10-CM

## 2021-05-18 LAB
ABS NRBC COUNT: 0 X 10 3/UL (ref 0–0.01)
ABSOLUTE BASOPHIL: 0.01 X 10 3/UL (ref 0–0.22)
ABSOLUTE EOSINOPHIL: 0.36 X 10 3/UL (ref 0.04–0.54)
ABSOLUTE IMMATURE GRAN: 0.03 X 10 3/UL (ref 0–0.04)
ABSOLUTE LYMPHOCYTE: 1.12 X 10 3/UL (ref 0.86–4.75)
ABSOLUTE MONOCYTE: 0.56 X 10 3/UL (ref 0.22–1.08)
BASOPHILS NFR BLD: 0.1 % (ref 0.2–1.2)
EOSINOPHIL NFR BLD: 4.5 % (ref 0.7–7)
GLUCOSE 1 HR POST 50 GM: 116 MG/DL
HCT VFR BLD AUTO: 34.6 % (ref 37–47)
HGB BLD-MCNC: 10.6 G/DL (ref 12–16)
IMMATURE GRANULOCYTES: 0.4 % (ref 0–0.5)
LYMPHOCYTES NFR BLD: 14.1 % (ref 19.3–53.1)
MCH RBC QN AUTO: 27 PG (ref 27–32)
MCHC RBC AUTO-ENTMCNC: 30.6 G/DL (ref 32–36)
MCV RBC AUTO: 88 FL (ref 82–100)
MONOCYTES NFR BLD: 7 % (ref 4.7–12.5)
NEUTROPHILS # BLD AUTO: 5.89 X 10 3/UL (ref 2.15–7.56)
NEUTROPHILS NFR BLD: 73.9 %
NUCLEATED RED BLOOD CELLS: 0 /100 WBC (ref 0–0.2)
PLATELET # BLD AUTO: 224 X 10 3/UL (ref 135–400)
RBC # BLD AUTO: 3.93 X 10 6/UL (ref 4.2–5.4)
RDW-SD: 39.9 FL (ref 37–54)
WBC # BLD: 7.97 X 10 3/UL (ref 4.3–10.8)

## 2021-05-18 PROCEDURE — 90715 TDAP VACCINE 7 YRS/> IM: CPT | Mod: S$GLB,,, | Performed by: OBSTETRICS & GYNECOLOGY

## 2021-05-18 PROCEDURE — 99213 OFFICE O/P EST LOW 20 MIN: CPT | Mod: 25,TH,S$GLB, | Performed by: OBSTETRICS & GYNECOLOGY

## 2021-05-18 PROCEDURE — 90471 IMMUNIZATION ADMIN: CPT | Mod: S$GLB,,, | Performed by: OBSTETRICS & GYNECOLOGY

## 2021-05-18 PROCEDURE — 90715 TDAP VACCINE GREATER THAN OR EQUAL TO 7YO IM: ICD-10-PCS | Mod: S$GLB,,, | Performed by: OBSTETRICS & GYNECOLOGY

## 2021-05-18 PROCEDURE — 90471 TDAP VACCINE GREATER THAN OR EQUAL TO 7YO IM: ICD-10-PCS | Mod: S$GLB,,, | Performed by: OBSTETRICS & GYNECOLOGY

## 2021-05-18 PROCEDURE — 99213 PR OFFICE/OUTPT VISIT, EST, LEVL III, 20-29 MIN: ICD-10-PCS | Mod: 25,TH,S$GLB, | Performed by: OBSTETRICS & GYNECOLOGY

## 2021-05-20 ENCOUNTER — PATIENT MESSAGE (OUTPATIENT)
Dept: OBSTETRICS AND GYNECOLOGY | Facility: CLINIC | Age: 26
End: 2021-05-20

## 2021-05-21 ENCOUNTER — TELEPHONE (OUTPATIENT)
Dept: OBSTETRICS AND GYNECOLOGY | Facility: CLINIC | Age: 26
End: 2021-05-21

## 2021-05-23 ENCOUNTER — PATIENT MESSAGE (OUTPATIENT)
Dept: OBSTETRICS AND GYNECOLOGY | Facility: CLINIC | Age: 26
End: 2021-05-23

## 2021-05-23 LAB
APPEARANCE, UA: CLEAR
BACTERIA SPEC CULT: ABNORMAL /HPF
BARBITURATE SCREEN URINE: NEGATIVE
BENZODIAZ UR QL SCN: NEGATIVE
BENZOYLECGONINE, URINE: NEGATIVE
BILIRUB UR QL STRIP: NEGATIVE MG/DL
CANNABINOID SCREEN URINE: NEGATIVE
CLARITHRO TITR SBT: NEGATIVE {TITER}
COLOR UR: ABNORMAL
GLUCOSE (UA): NORMAL MG/DL
HGB UR QL STRIP: NEGATIVE /UL
KETONES UR QL STRIP: NEGATIVE MG/DL
LEUKOCYTE ESTERASE UR QL STRIP: 500 /UL
METHADONE UR QL SCN: NEGATIVE
NITRITE UR QL STRIP: NEGATIVE
OPIATES UR QL SCN: NEGATIVE
PCP UR QL SCN: NEGATIVE
PH UR STRIP: 7 PH (ref 5–9)
PH, URINE (TOX): 7 (ref 5–8)
PROT UR QL STRIP: NEGATIVE MG/DL
RBC #/AREA URNS HPF: ABNORMAL /HPF (ref 0–2)
SERVICE COMMENT 03: ABNORMAL
SP GR UR STRIP: 1.01 (ref 1–1.03)
SPECIMEN COLLECTION METHOD, URINE: ABNORMAL
SQUAMOUS EPITHELIAL, UA: ABNORMAL /LPF
UROBILINOGEN UR STRIP-ACNC: NORMAL MG/DL
WBC #/AREA URNS HPF: ABNORMAL /HPF (ref 0–5)

## 2021-06-02 ENCOUNTER — PATIENT MESSAGE (OUTPATIENT)
Dept: OBSTETRICS AND GYNECOLOGY | Facility: CLINIC | Age: 26
End: 2021-06-02

## 2021-06-15 ENCOUNTER — ROUTINE PRENATAL (OUTPATIENT)
Dept: OBSTETRICS AND GYNECOLOGY | Facility: CLINIC | Age: 26
End: 2021-06-15
Payer: MEDICAID

## 2021-06-15 VITALS
BODY MASS INDEX: 33.01 KG/M2 | SYSTOLIC BLOOD PRESSURE: 107 MMHG | WEIGHT: 169 LBS | HEART RATE: 96 BPM | DIASTOLIC BLOOD PRESSURE: 68 MMHG

## 2021-06-15 DIAGNOSIS — Z34.83 ENCOUNTER FOR SUPERVISION OF OTHER NORMAL PREGNANCY IN THIRD TRIMESTER: Primary | ICD-10-CM

## 2021-06-15 PROCEDURE — 99213 PR OFFICE/OUTPT VISIT, EST, LEVL III, 20-29 MIN: ICD-10-PCS | Mod: TH,S$GLB,, | Performed by: OBSTETRICS & GYNECOLOGY

## 2021-06-15 PROCEDURE — 99213 OFFICE O/P EST LOW 20 MIN: CPT | Mod: TH,S$GLB,, | Performed by: OBSTETRICS & GYNECOLOGY

## 2021-06-23 DIAGNOSIS — Z34.83 ENCOUNTER FOR SUPERVISION OF OTHER NORMAL PREGNANCY IN THIRD TRIMESTER: Primary | ICD-10-CM

## 2021-06-28 ENCOUNTER — TELEPHONE (OUTPATIENT)
Dept: OBSTETRICS AND GYNECOLOGY | Facility: CLINIC | Age: 26
End: 2021-06-28

## 2021-06-29 ENCOUNTER — PROCEDURE VISIT (OUTPATIENT)
Dept: OBSTETRICS AND GYNECOLOGY | Facility: CLINIC | Age: 26
End: 2021-06-29
Payer: MEDICAID

## 2021-06-29 ENCOUNTER — ROUTINE PRENATAL (OUTPATIENT)
Dept: OBSTETRICS AND GYNECOLOGY | Facility: CLINIC | Age: 26
End: 2021-06-29
Payer: MEDICAID

## 2021-06-29 VITALS
DIASTOLIC BLOOD PRESSURE: 73 MMHG | HEART RATE: 88 BPM | WEIGHT: 170 LBS | BODY MASS INDEX: 33.2 KG/M2 | SYSTOLIC BLOOD PRESSURE: 116 MMHG

## 2021-06-29 DIAGNOSIS — Z34.83 ENCOUNTER FOR SUPERVISION OF OTHER NORMAL PREGNANCY IN THIRD TRIMESTER: ICD-10-CM

## 2021-06-29 DIAGNOSIS — Z34.83 ENCOUNTER FOR SUPERVISION OF OTHER NORMAL PREGNANCY IN THIRD TRIMESTER: Primary | ICD-10-CM

## 2021-06-29 PROCEDURE — 76815 OB US LIMITED FETUS(S): CPT | Mod: S$GLB,,, | Performed by: OBSTETRICS & GYNECOLOGY

## 2021-06-29 PROCEDURE — 76815 PR  US,PREGNANT UTERUS,LIMITED, 1/> FETUSES: ICD-10-PCS | Mod: S$GLB,,, | Performed by: OBSTETRICS & GYNECOLOGY

## 2021-06-29 PROCEDURE — 99213 PR OFFICE/OUTPT VISIT, EST, LEVL III, 20-29 MIN: ICD-10-PCS | Mod: 25,TH,S$GLB, | Performed by: OBSTETRICS & GYNECOLOGY

## 2021-06-29 PROCEDURE — 99213 OFFICE O/P EST LOW 20 MIN: CPT | Mod: 25,TH,S$GLB, | Performed by: OBSTETRICS & GYNECOLOGY

## 2021-07-01 LAB
APPEARANCE, UA: ABNORMAL
BACTERIA SPEC CULT: ABNORMAL /HPF
BILIRUB UR QL STRIP: NEGATIVE MG/DL
COLOR UR: ABNORMAL
GLUCOSE (UA): NORMAL MG/DL
HGB UR QL STRIP: NEGATIVE /UL
KETONES UR QL STRIP: NEGATIVE MG/DL
LEUKOCYTE ESTERASE UR QL STRIP: 100 /UL
NITRITE UR QL STRIP: NEGATIVE
PH UR STRIP: 7 PH (ref 5–9)
PROT UR QL STRIP: NEGATIVE MG/DL
RBC #/AREA URNS HPF: ABNORMAL /HPF (ref 0–2)
SERVICE COMMENT 03: ABNORMAL
SP GR UR STRIP: 1.01 (ref 1–1.03)
SPECIMEN COLLECTION METHOD, URINE: ABNORMAL
SQUAMOUS EPITHELIAL, UA: ABNORMAL /LPF
UROBILINOGEN UR STRIP-ACNC: NORMAL MG/DL
WBC #/AREA URNS HPF: ABNORMAL /HPF (ref 0–5)

## 2021-07-02 ENCOUNTER — PATIENT MESSAGE (OUTPATIENT)
Dept: OBSTETRICS AND GYNECOLOGY | Facility: CLINIC | Age: 26
End: 2021-07-02

## 2021-07-02 ENCOUNTER — TELEPHONE (OUTPATIENT)
Dept: OBSTETRICS AND GYNECOLOGY | Facility: CLINIC | Age: 26
End: 2021-07-02

## 2021-07-06 ENCOUNTER — PATIENT MESSAGE (OUTPATIENT)
Dept: OBSTETRICS AND GYNECOLOGY | Facility: CLINIC | Age: 26
End: 2021-07-06

## 2021-07-07 ENCOUNTER — ROUTINE PRENATAL (OUTPATIENT)
Dept: OBSTETRICS AND GYNECOLOGY | Facility: CLINIC | Age: 26
End: 2021-07-07
Payer: MEDICAID

## 2021-07-07 VITALS
DIASTOLIC BLOOD PRESSURE: 67 MMHG | WEIGHT: 168 LBS | BODY MASS INDEX: 32.81 KG/M2 | HEART RATE: 63 BPM | SYSTOLIC BLOOD PRESSURE: 114 MMHG

## 2021-07-07 DIAGNOSIS — Z34.83 ENCOUNTER FOR SUPERVISION OF OTHER NORMAL PREGNANCY IN THIRD TRIMESTER: Primary | ICD-10-CM

## 2021-07-07 DIAGNOSIS — O47.03 PRETERM UTERINE CONTRACTIONS IN THIRD TRIMESTER, ANTEPARTUM: ICD-10-CM

## 2021-07-07 PROCEDURE — 99213 PR OFFICE/OUTPT VISIT, EST, LEVL III, 20-29 MIN: ICD-10-PCS | Mod: TH,S$GLB,, | Performed by: OBSTETRICS & GYNECOLOGY

## 2021-07-07 PROCEDURE — 99213 OFFICE O/P EST LOW 20 MIN: CPT | Mod: TH,S$GLB,, | Performed by: OBSTETRICS & GYNECOLOGY

## 2021-07-08 ENCOUNTER — PATIENT MESSAGE (OUTPATIENT)
Dept: OBSTETRICS AND GYNECOLOGY | Facility: CLINIC | Age: 26
End: 2021-07-08

## 2021-07-08 LAB
APPEARANCE, UA: CLEAR
BACTERIA SPEC CULT: ABNORMAL /HPF
BILIRUB UR QL STRIP: NEGATIVE MG/DL
COLOR UR: ABNORMAL
GLUCOSE (UA): NORMAL MG/DL
GROUP B STREP MOLECULAR: NEGATIVE
HGB UR QL STRIP: NEGATIVE /UL
KETONES UR QL STRIP: NEGATIVE MG/DL
LEUKOCYTE ESTERASE UR QL STRIP: 500 /UL
MUCUS URINE: ABNORMAL /LPF
NITRITE UR QL STRIP: NEGATIVE
PENICILLIN ALLERGIC: NO
PH UR STRIP: 8 PH (ref 5–9)
PROT UR QL STRIP: NEGATIVE MG/DL
RBC #/AREA URNS HPF: ABNORMAL /HPF (ref 0–2)
SERVICE COMMENT 03: ABNORMAL
SP GR UR STRIP: 1.01 (ref 1–1.03)
SPECIMEN COLLECTION METHOD, URINE: ABNORMAL
SQUAMOUS EPITHELIAL, UA: ABNORMAL /LPF
UROBILINOGEN UR STRIP-ACNC: NORMAL MG/DL
WBC #/AREA URNS HPF: ABNORMAL /HPF (ref 0–5)

## 2021-07-13 ENCOUNTER — ROUTINE PRENATAL (OUTPATIENT)
Dept: OBSTETRICS AND GYNECOLOGY | Facility: CLINIC | Age: 26
End: 2021-07-13
Payer: MEDICAID

## 2021-07-13 VITALS
SYSTOLIC BLOOD PRESSURE: 123 MMHG | BODY MASS INDEX: 33.79 KG/M2 | WEIGHT: 173 LBS | DIASTOLIC BLOOD PRESSURE: 77 MMHG | HEART RATE: 78 BPM

## 2021-07-13 DIAGNOSIS — Z34.83 ENCOUNTER FOR SUPERVISION OF OTHER NORMAL PREGNANCY IN THIRD TRIMESTER: Primary | ICD-10-CM

## 2021-07-13 PROCEDURE — 99213 OFFICE O/P EST LOW 20 MIN: CPT | Mod: TH,S$GLB,, | Performed by: OBSTETRICS & GYNECOLOGY

## 2021-07-13 PROCEDURE — 99213 PR OFFICE/OUTPT VISIT, EST, LEVL III, 20-29 MIN: ICD-10-PCS | Mod: TH,S$GLB,, | Performed by: OBSTETRICS & GYNECOLOGY

## 2021-07-20 ENCOUNTER — PATIENT MESSAGE (OUTPATIENT)
Dept: OBSTETRICS AND GYNECOLOGY | Facility: CLINIC | Age: 26
End: 2021-07-20

## 2021-07-20 LAB
APPEARANCE, UA: CLEAR
BACTERIA SPEC CULT: ABNORMAL /HPF
BASOPHILS NFR BLD: 0.4 % (ref 0–3)
BILIRUB UR QL STRIP: NEGATIVE MG/DL
CALCIUM BLD-MCNC: POSITIVE MG/DL
COLOR UR: ABNORMAL
COVID-19 AB, IGM: NEGATIVE
EOSINOPHIL NFR BLD: 3 % (ref 1–3)
ERYTHROCYTE [DISTWIDTH] IN BLOOD BY AUTOMATED COUNT: 15 % (ref 12.5–18)
GLUCOSE (UA): NORMAL MG/DL
HCT VFR BLD AUTO: 37 % (ref 37–47)
HGB BLD-MCNC: 11.6 G/DL (ref 12–16)
HGB UR QL STRIP: NEGATIVE /UL
KETONES UR QL STRIP: NEGATIVE MG/DL
LEUKOCYTE ESTERASE UR QL STRIP: 100 /UL
LYMPHOCYTES NFR BLD: 12.6 % (ref 25–40)
MCH RBC QN AUTO: 26.3 PG (ref 27–31.2)
MCHC RBC AUTO-ENTMCNC: 31.4 G/DL (ref 31.8–35.4)
MCV RBC AUTO: 83.9 FL (ref 80–97)
MONOCYTES NFR BLD: 7.1 % (ref 1–15)
NEUTROPHILS # BLD AUTO: 5.63 10*3/UL (ref 1.8–7.7)
NEUTROPHILS NFR BLD: 76.5 % (ref 37–80)
NITRITE UR QL STRIP: NEGATIVE
NUCLEATED RED BLOOD CELLS: 0 %
PH UR STRIP: 7 PH (ref 5–9)
PLATELETS: 185 10*3/UL (ref 142–424)
PROT UR QL STRIP: NEGATIVE MG/DL
RBC # BLD AUTO: 4.41 10*6/UL (ref 4.2–5.4)
RBC #/AREA URNS HPF: ABNORMAL /HPF (ref 0–2)
RPR: NON REACTIVE
SERVICE COMMENT 03: ABNORMAL
SP GR UR STRIP: 1.01 (ref 1–1.03)
SPECIMEN COLLECTION METHOD, URINE: ABNORMAL
SQUAMOUS EPITHELIAL, UA: ABNORMAL /LPF
UROBILINOGEN UR STRIP-ACNC: NORMAL MG/DL
WBC # BLD: 7.4 10*3/UL (ref 4.6–10.2)
WBC #/AREA URNS HPF: ABNORMAL /HPF (ref 0–5)

## 2021-07-22 LAB
HCT VFR BLD AUTO: 31.8 % (ref 37–47)
HGB BLD-MCNC: 10.2 G/DL (ref 12–16)

## 2021-07-23 ENCOUNTER — PATIENT MESSAGE (OUTPATIENT)
Dept: OBSTETRICS AND GYNECOLOGY | Facility: CLINIC | Age: 26
End: 2021-07-23

## 2021-07-23 ENCOUNTER — TELEPHONE (OUTPATIENT)
Dept: OBSTETRICS AND GYNECOLOGY | Facility: CLINIC | Age: 26
End: 2021-07-23

## 2021-07-23 ENCOUNTER — OUTSIDE PLACE OF SERVICE (OUTPATIENT)
Dept: OBSTETRICS AND GYNECOLOGY | Facility: CLINIC | Age: 26
End: 2021-07-23
Payer: MEDICAID

## 2021-07-23 PROCEDURE — 59514 PR CESAREAN DELIVERY ONLY: ICD-10-PCS | Mod: AT,,, | Performed by: OBSTETRICS & GYNECOLOGY

## 2021-07-23 PROCEDURE — 59514 CESAREAN DELIVERY ONLY: CPT | Mod: AT,,, | Performed by: OBSTETRICS & GYNECOLOGY

## 2021-07-24 PROCEDURE — 99231 PR SUBSEQUENT HOSPITAL CARE,LEVL I: ICD-10-PCS | Mod: ,,, | Performed by: OBSTETRICS & GYNECOLOGY

## 2021-07-24 PROCEDURE — 99231 SBSQ HOSP IP/OBS SF/LOW 25: CPT | Mod: ,,, | Performed by: OBSTETRICS & GYNECOLOGY

## 2021-07-26 ENCOUNTER — TELEPHONE (OUTPATIENT)
Dept: OBSTETRICS AND GYNECOLOGY | Facility: CLINIC | Age: 26
End: 2021-07-26

## 2021-07-26 ENCOUNTER — PATIENT MESSAGE (OUTPATIENT)
Dept: OBSTETRICS AND GYNECOLOGY | Facility: CLINIC | Age: 26
End: 2021-07-26

## 2021-07-27 ENCOUNTER — TELEPHONE (OUTPATIENT)
Dept: OBSTETRICS AND GYNECOLOGY | Facility: CLINIC | Age: 26
End: 2021-07-27

## 2021-08-06 ENCOUNTER — PATIENT MESSAGE (OUTPATIENT)
Dept: OBSTETRICS AND GYNECOLOGY | Facility: CLINIC | Age: 26
End: 2021-08-06

## 2021-09-01 ENCOUNTER — POSTPARTUM VISIT (OUTPATIENT)
Dept: OBSTETRICS AND GYNECOLOGY | Facility: CLINIC | Age: 26
End: 2021-09-01
Payer: MEDICAID

## 2021-09-01 VITALS
SYSTOLIC BLOOD PRESSURE: 120 MMHG | WEIGHT: 157.63 LBS | HEART RATE: 79 BPM | BODY MASS INDEX: 30.78 KG/M2 | DIASTOLIC BLOOD PRESSURE: 79 MMHG

## 2021-09-01 PROCEDURE — 59430 PR CARE AFTER DELIVERY ONLY: ICD-10-PCS | Mod: S$GLB,,, | Performed by: OBSTETRICS & GYNECOLOGY

## 2021-09-05 ENCOUNTER — PATIENT MESSAGE (OUTPATIENT)
Dept: OBSTETRICS AND GYNECOLOGY | Facility: CLINIC | Age: 26
End: 2021-09-05

## 2022-02-14 ENCOUNTER — TELEPHONE (OUTPATIENT)
Dept: OBSTETRICS AND GYNECOLOGY | Facility: CLINIC | Age: 27
End: 2022-02-14

## 2022-02-14 NOTE — TELEPHONE ENCOUNTER
I let pt know that she will have to get  in with her PCP for her depression and anxiety. She has an apt with Monique Arroyo she said she was put on the waiting list. I advised her that if she is have thoughts of hurting herself or her children she will need to come into the ER. She verbalized understanding

## 2022-02-14 NOTE — TELEPHONE ENCOUNTER
----- Message from Heather Plummer sent at 2/14/2022 11:03 AM CST -----  Contact: self  Pt calling would like to speak with nurse about felling of depressing and anxiety and she can be reached at [phone]    Thanks,

## 2022-12-21 DIAGNOSIS — Z34.91 ENCOUNTER FOR PREGNANCY RELATED EXAMINATION IN FIRST TRIMESTER: Primary | ICD-10-CM

## 2022-12-22 ENCOUNTER — PROCEDURE VISIT (OUTPATIENT)
Dept: OBSTETRICS AND GYNECOLOGY | Facility: CLINIC | Age: 27
End: 2022-12-22
Payer: MEDICAID

## 2022-12-22 DIAGNOSIS — Z34.91 ENCOUNTER FOR PREGNANCY RELATED EXAMINATION IN FIRST TRIMESTER: ICD-10-CM

## 2022-12-22 PROCEDURE — 76801 US OB/GYN PROCEDURE (VIEWPOINT): ICD-10-PCS | Mod: S$GLB,,, | Performed by: OBSTETRICS & GYNECOLOGY

## 2022-12-22 PROCEDURE — 76801 OB US < 14 WKS SINGLE FETUS: CPT | Mod: S$GLB,,, | Performed by: OBSTETRICS & GYNECOLOGY

## 2022-12-23 LAB
ABS NRBC COUNT: 0 X 10 3/UL (ref 0–0.01)
ABSOLUTE BASOPHIL: 0.01 X 10 3/UL (ref 0–0.22)
ABSOLUTE EOSINOPHIL: 0.24 X 10 3/UL (ref 0.04–0.54)
ABSOLUTE IMMATURE GRAN: 0.02 X 10 3/UL (ref 0–0.04)
ABSOLUTE LYMPHOCYTE: 0.93 X 10 3/UL (ref 0.86–4.75)
ABSOLUTE MONOCYTE: 0.43 X 10 3/UL (ref 0.22–1.08)
ANTIBODY SCREEN: NEGATIVE
BASOPHILS NFR BLD: 0.2 % (ref 0.2–1.2)
BLOOD GROUPING: NORMAL
BLOOD TYPE (D): POSITIVE
EOSINOPHIL NFR BLD: 3.7 % (ref 0.7–7)
HBV SURFACE AG SERPL QL IA: NONREACTIVE
HCT VFR BLD AUTO: 38.8 % (ref 37–47)
HCV IGG SERPL QL IA: NONREACTIVE
HGB BLD-MCNC: 12.9 G/DL (ref 12–16)
HIV 1+2 AB+HIV1 P24 AG SERPL QL IA: NONREACTIVE
IMMATURE GRANULOCYTES: 0.3 % (ref 0–0.5)
LYMPHOCYTES NFR BLD: 14.5 % (ref 19.3–53.1)
MCH RBC QN AUTO: 28.9 PG (ref 27–32)
MCHC RBC AUTO-ENTMCNC: 33.2 G/DL (ref 32–36)
MCV RBC AUTO: 86.8 FL (ref 82–100)
MONOCYTES NFR BLD: 6.7 % (ref 4.7–12.5)
NEUTROPHILS # BLD AUTO: 4.78 X 10 3/UL (ref 2.15–7.56)
NEUTROPHILS NFR BLD: 74.6 % (ref 34–71.1)
NUCLEATED RED BLOOD CELLS: 0 /100 WBC (ref 0–0.2)
PLATELET # BLD AUTO: 229 X 10 3/UL (ref 135–400)
RBC # BLD AUTO: 4.47 X 10 6/UL (ref 4.2–5.4)
RDW-SD: 41.2 FL (ref 37–54)
RUBELLA IGG SCREEN: NORMAL
SICKLE CELL PREP: NEGATIVE
SYPHILIS TREPONEMAL ANTIBODY: NONREACTIVE
URINE CULTURE, ROUTINE: NORMAL
WBC # BLD: 6.41 X 10 3/UL (ref 4.3–10.8)

## 2022-12-30 ENCOUNTER — TELEPHONE (OUTPATIENT)
Dept: OBSTETRICS AND GYNECOLOGY | Facility: CLINIC | Age: 27
End: 2022-12-30

## 2022-12-30 ENCOUNTER — PATIENT MESSAGE (OUTPATIENT)
Dept: OBSTETRICS AND GYNECOLOGY | Facility: CLINIC | Age: 27
End: 2022-12-30

## 2022-12-30 NOTE — TELEPHONE ENCOUNTER
Number provided was answered by male that stated he spoke no English. Will send pt a [employer] message.  ----- Message from Emely Willams sent at 12/30/2022  1:18 PM CST -----  Contact: pt  .Type:  Needs Medical Advice    Who Called: chari   Symptoms (please be specific):  leg CRAMPING & LEG SLEEPING   How long has patient had these symptoms:    Pharmacy name and phone #:  .  [id number] - [place], [place] - [address] AT  &   [address]  [place] [address]  Phone: [phone] Fax: [phone]      Would the patient rather a call back or a response via [facility]?   Best Call Back Number: .[phone]    Additional Information:

## 2023-01-27 ENCOUNTER — ROUTINE PRENATAL (OUTPATIENT)
Dept: OBSTETRICS AND GYNECOLOGY | Facility: CLINIC | Age: 28
End: 2023-01-27
Payer: MEDICAID

## 2023-01-27 VITALS
HEART RATE: 76 BPM | SYSTOLIC BLOOD PRESSURE: 107 MMHG | WEIGHT: 160 LBS | DIASTOLIC BLOOD PRESSURE: 68 MMHG | BODY MASS INDEX: 31.25 KG/M2

## 2023-01-27 DIAGNOSIS — Z34.91 ENCOUNTER FOR PREGNANCY RELATED EXAMINATION IN FIRST TRIMESTER: Primary | ICD-10-CM

## 2023-01-27 PROCEDURE — 99204 OFFICE O/P NEW MOD 45 MIN: CPT | Mod: TH,S$GLB,, | Performed by: OBSTETRICS & GYNECOLOGY

## 2023-01-27 PROCEDURE — 99204 PR OFFICE/OUTPT VISIT, NEW, LEVL IV, 45-59 MIN: ICD-10-PCS | Mod: TH,S$GLB,, | Performed by: OBSTETRICS & GYNECOLOGY

## 2023-01-27 NOTE — PROGRESS NOTES
Subjective:       Patient ID: Ricarda Daugherty is a 28 y.o.  at 13w4d   Chief Complaint:  Routine Prenatal Visit      History of Present Illness  here for new ob exam.  Labs and history were reviewed with the patient today  No complaints      History reviewed. No pertinent past medical history.    History reviewed. No pertinent surgical history.        Review of Systems  nml 1st trimester sx- sob, dec excercixe tolerance, fatigue and nausea  Neg for vag bleed, dc, vomiting, cp, lof, fever, chills, ns, visual changes, swelling, headaches, constipation/diarrhea, dysuria, freq/urgency of urination     Objective:     Vitals:    23 1021   BP: 107/68   Pulse: 76   Weight: 72.6 kg (160 lb)       NAD  NCAT  pupils normal size  Skin nml no rashes or lesions  No resp distress, resp even and unlabored  nt nd, no rebound no guarding  nml ext fem gent, normal cvx uterus and adnexa, no dc or bleeding  nml appearing rectum  No cyanosis or clubbing, edema appropriate for pregn    Uterus size approp for gest age         Assessment:        1. Encounter for pregnancy related examination in first trimester               Plan:      Encounter for pregnancy related examination in first trimester  -     Liquid-based pap smear, screening           Pain fever bleeding precautions  Encouraged PNV  rtc 4 wks

## 2023-02-01 LAB — Lab: NORMAL

## 2023-02-13 ENCOUNTER — PATIENT MESSAGE (OUTPATIENT)
Dept: OTHER | Facility: OTHER | Age: 28
End: 2023-02-13
Payer: MEDICAID

## 2023-02-16 ENCOUNTER — TELEPHONE (OUTPATIENT)
Dept: OBSTETRICS AND GYNECOLOGY | Facility: CLINIC | Age: 28
End: 2023-02-16
Payer: MEDICAID

## 2023-02-16 NOTE — TELEPHONE ENCOUNTER
I called pt back and let her know to keep hydrated and if she feels like she needs to be seen to come into the ER . We can discuss at her next apt. Pt verbalized understanding.

## 2023-02-16 NOTE — TELEPHONE ENCOUNTER
----- Message from Thi Shelton sent at 2/16/2023 11:34 AM CST -----  Contact: self  Type: #OB Escalation#      Patient Name:  Ricarda Daugherty   MRN: [id number]  Best Call Back Number:  [phone]   Patient's Provider: Michelet  How far along is the patient?: 16 weeks  Nature of the Call:  Feels like her heart is racing when standing, completing activities - some light-headedness that started this morning  Additional Information: N/a

## 2023-02-20 ENCOUNTER — PATIENT MESSAGE (OUTPATIENT)
Dept: OTHER | Facility: OTHER | Age: 28
End: 2023-02-20
Payer: MEDICAID

## 2023-02-23 ENCOUNTER — ROUTINE PRENATAL (OUTPATIENT)
Dept: OBSTETRICS AND GYNECOLOGY | Facility: CLINIC | Age: 28
End: 2023-02-23
Payer: MEDICAID

## 2023-02-23 VITALS
DIASTOLIC BLOOD PRESSURE: 69 MMHG | WEIGHT: 158 LBS | BODY MASS INDEX: 30.86 KG/M2 | SYSTOLIC BLOOD PRESSURE: 106 MMHG | HEART RATE: 89 BPM

## 2023-02-23 DIAGNOSIS — Z34.82 ENCOUNTER FOR SUPERVISION OF OTHER NORMAL PREGNANCY IN SECOND TRIMESTER: Primary | ICD-10-CM

## 2023-02-23 PROCEDURE — 99213 PR OFFICE/OUTPT VISIT, EST, LEVL III, 20-29 MIN: ICD-10-PCS | Mod: TH,S$GLB,, | Performed by: OBSTETRICS & GYNECOLOGY

## 2023-02-23 PROCEDURE — 99213 OFFICE O/P EST LOW 20 MIN: CPT | Mod: TH,S$GLB,, | Performed by: OBSTETRICS & GYNECOLOGY

## 2023-02-23 NOTE — PROGRESS NOTES
Subjective:       Patient ID: Ricarda Daugherty is a 28 y.o.  at 17w3d     Chief Complaint:  Routine Prenatal Visit      History of Present Illness  No complaints. Labs and history reviewed with pt.         Review of Systems  Denies n/v, f/c, dysuria, contractions,   VD, VB, round ligament pain, headaches       Objective:     Vitals:    23 0906   BP: 106/69   Pulse: 89     Wt Readings from Last 3 Encounters:   23 71.7 kg (158 lb)   23 72.6 kg (160 lb)   21 71.5 kg (157 lb 9.6 oz)     nad  NCAT  pupils normal size  Skin nml no rashes or lesions  No resp distress, resp even and unlabored   nt, nd,  no rebound no guarding  No cyanosis or clubbing, edema appropriate for pregn  FHT: 140s   Assessment:      No diagnosis found.            Plan:         Encouraged PNV  Pain, fever, bleeding precautions   RTC 4 weeks

## 2023-03-13 ENCOUNTER — PATIENT MESSAGE (OUTPATIENT)
Dept: OTHER | Facility: OTHER | Age: 28
End: 2023-03-13
Payer: MEDICAID

## 2023-03-17 DIAGNOSIS — Z34.82 ENCOUNTER FOR SUPERVISION OF OTHER NORMAL PREGNANCY IN SECOND TRIMESTER: Primary | ICD-10-CM

## 2023-03-23 ENCOUNTER — PROCEDURE VISIT (OUTPATIENT)
Dept: OBSTETRICS AND GYNECOLOGY | Facility: CLINIC | Age: 28
End: 2023-03-23
Payer: MEDICAID

## 2023-03-23 ENCOUNTER — ROUTINE PRENATAL (OUTPATIENT)
Dept: OBSTETRICS AND GYNECOLOGY | Facility: CLINIC | Age: 28
End: 2023-03-23
Payer: MEDICAID

## 2023-03-23 VITALS
DIASTOLIC BLOOD PRESSURE: 68 MMHG | HEART RATE: 78 BPM | WEIGHT: 162 LBS | BODY MASS INDEX: 31.64 KG/M2 | SYSTOLIC BLOOD PRESSURE: 105 MMHG

## 2023-03-23 DIAGNOSIS — Z34.82 ENCOUNTER FOR SUPERVISION OF OTHER NORMAL PREGNANCY IN SECOND TRIMESTER: Primary | ICD-10-CM

## 2023-03-23 DIAGNOSIS — Z34.82 ENCOUNTER FOR SUPERVISION OF OTHER NORMAL PREGNANCY IN SECOND TRIMESTER: ICD-10-CM

## 2023-03-23 PROCEDURE — 99213 OFFICE O/P EST LOW 20 MIN: CPT | Mod: 25,TH,S$GLB, | Performed by: OBSTETRICS & GYNECOLOGY

## 2023-03-23 PROCEDURE — 76805 OB US >/= 14 WKS SNGL FETUS: CPT | Mod: S$GLB,,, | Performed by: OBSTETRICS & GYNECOLOGY

## 2023-03-23 PROCEDURE — 76805 US OB/GYN PROCEDURE (VIEWPOINT): ICD-10-PCS | Mod: S$GLB,,, | Performed by: OBSTETRICS & GYNECOLOGY

## 2023-03-23 PROCEDURE — 99213 PR OFFICE/OUTPT VISIT, EST, LEVL III, 20-29 MIN: ICD-10-PCS | Mod: 25,TH,S$GLB, | Performed by: OBSTETRICS & GYNECOLOGY

## 2023-03-24 NOTE — PROGRESS NOTES
Subjective:       Patient ID: Ricarda Daugherty is a 28 y.o.  at 21w4d     Chief Complaint:  Routine Prenatal Visit      History of Present Illness  No complaints. Labs and history reviewed with pt.         Review of Systems  Denies n/v, f/c, dysuria, contractions,   VD, VB, round ligament pain, headaches       Objective:     Vitals:    23 0911   BP: 105/68   Pulse: 78     Wt Readings from Last 3 Encounters:   23 73.5 kg (162 lb)   23 71.7 kg (158 lb)   23 72.6 kg (160 lb)     nad  NCAT  pupils normal size  Skin nml no rashes or lesions  No resp distress, resp even and unlabored   nt, nd,  no rebound no guarding  No cyanosis or clubbing, edema appropriate for pregn  FHT: 140s   Assessment:      No diagnosis found.            Plan:      Anatomy normal, needs c-spine    Encouraged PNV  Pain, fever, bleeding precautions   RTC 4 weeks

## 2023-04-10 ENCOUNTER — PATIENT MESSAGE (OUTPATIENT)
Dept: OTHER | Facility: OTHER | Age: 28
End: 2023-04-10
Payer: MEDICAID

## 2023-04-17 ENCOUNTER — TELEPHONE (OUTPATIENT)
Dept: OBSTETRICS AND GYNECOLOGY | Facility: CLINIC | Age: 28
End: 2023-04-17
Payer: MEDICAID

## 2023-04-17 LAB
AMORPHOUS URINE: ABNORMAL /LPF
APPEARANCE, UA: ABNORMAL
BACTERIA SPEC CULT: ABNORMAL /HPF
BILIRUB UR QL STRIP: NEGATIVE MG/DL
COLOR UR: YELLOW
GLUCOSE (UA): NORMAL MG/DL
HGB UR QL STRIP: NEGATIVE /UL
KETONES UR QL STRIP: NEGATIVE MG/DL
LEUKOCYTE ESTERASE UR QL STRIP: 25 /UL
NITRITE UR QL STRIP: NEGATIVE
PH UR STRIP: 7 PH (ref 5–9)
PROT UR QL STRIP: NEGATIVE MG/DL
RBC #/AREA URNS HPF: ABNORMAL /HPF (ref 0–2)
SERVICE COMMENT 03: ABNORMAL
SP GR UR STRIP: 1.01 (ref 1–1.03)
SPECIMEN COLLECTION METHOD, URINE: ABNORMAL
SQUAMOUS EPITHELIAL, UA: ABNORMAL /LPF
UROBILINOGEN UR STRIP-ACNC: NORMAL MG/DL
WBC #/AREA URNS HPF: ABNORMAL /HPF (ref 0–5)

## 2023-04-17 NOTE — TELEPHONE ENCOUNTER
Pt states that she is having severe pelvic pain and gets dizzy when she gets up. I advised pt to come in to labor and delivery to be evaluated. Pt understood.

## 2023-04-20 ENCOUNTER — ROUTINE PRENATAL (OUTPATIENT)
Dept: OBSTETRICS AND GYNECOLOGY | Facility: CLINIC | Age: 28
End: 2023-04-20
Payer: MEDICAID

## 2023-04-20 VITALS
BODY MASS INDEX: 32.42 KG/M2 | DIASTOLIC BLOOD PRESSURE: 74 MMHG | SYSTOLIC BLOOD PRESSURE: 120 MMHG | HEART RATE: 103 BPM | WEIGHT: 166 LBS

## 2023-04-20 DIAGNOSIS — Z34.82 ENCOUNTER FOR SUPERVISION OF OTHER NORMAL PREGNANCY IN SECOND TRIMESTER: Primary | ICD-10-CM

## 2023-04-20 PROCEDURE — 99213 PR OFFICE/OUTPT VISIT, EST, LEVL III, 20-29 MIN: ICD-10-PCS | Mod: TH,S$GLB,, | Performed by: OBSTETRICS & GYNECOLOGY

## 2023-04-20 PROCEDURE — 99213 OFFICE O/P EST LOW 20 MIN: CPT | Mod: TH,S$GLB,, | Performed by: OBSTETRICS & GYNECOLOGY

## 2023-04-20 NOTE — PROGRESS NOTES
Subjective:       Patient ID: Ricarda Daugherty is a 28 y.o.  at 25w3d     Chief Complaint:  Routine Prenatal Visit      History of Present Illness  No complaints. Labs and history reviewed with pt.         Review of Systems  Denies n/v, f/c, dysuria, contractions,   VD, VB, round ligament pain, headaches       Objective:     Vitals:    23 0928   BP: 120/74   Pulse: 103     Wt Readings from Last 3 Encounters:   23 75.3 kg (166 lb)   23 73.5 kg (162 lb)   23 71.7 kg (158 lb)     nad  NCAT  pupils normal size  Skin nml no rashes or lesions  No resp distress, resp even and unlabored   nt, nd,  no rebound no guarding  No cyanosis or clubbing, edema appropriate for pregn  FHT: 140s   Assessment:        1. Encounter for supervision of other normal pregnancy in second trimester                Plan:      Anatomy normal, needs c-spine    Encouraged PNV  Pain, fever, bleeding precautions   RTC 4 weeks

## 2023-04-24 ENCOUNTER — PATIENT MESSAGE (OUTPATIENT)
Dept: OTHER | Facility: OTHER | Age: 28
End: 2023-04-24
Payer: MEDICAID

## 2023-04-28 LAB — GLUCOSE 1 HR POST 50 GM: 80 MG/DL

## 2023-05-08 ENCOUNTER — PATIENT MESSAGE (OUTPATIENT)
Dept: OTHER | Facility: OTHER | Age: 28
End: 2023-05-08
Payer: MEDICAID

## 2023-05-19 ENCOUNTER — ROUTINE PRENATAL (OUTPATIENT)
Dept: OBSTETRICS AND GYNECOLOGY | Facility: CLINIC | Age: 28
End: 2023-05-19
Payer: MEDICAID

## 2023-05-19 VITALS
BODY MASS INDEX: 31.83 KG/M2 | SYSTOLIC BLOOD PRESSURE: 99 MMHG | WEIGHT: 163 LBS | HEART RATE: 99 BPM | DIASTOLIC BLOOD PRESSURE: 65 MMHG

## 2023-05-19 DIAGNOSIS — Z34.83 ENCOUNTER FOR SUPERVISION OF OTHER NORMAL PREGNANCY IN THIRD TRIMESTER: Primary | ICD-10-CM

## 2023-05-19 PROCEDURE — 90471 TDAP VACCINE GREATER THAN OR EQUAL TO 7YO IM: ICD-10-PCS | Mod: S$GLB,,, | Performed by: OBSTETRICS & GYNECOLOGY

## 2023-05-19 PROCEDURE — 90715 TDAP VACCINE 7 YRS/> IM: CPT | Mod: S$GLB,,, | Performed by: OBSTETRICS & GYNECOLOGY

## 2023-05-19 PROCEDURE — 90471 IMMUNIZATION ADMIN: CPT | Mod: S$GLB,,, | Performed by: OBSTETRICS & GYNECOLOGY

## 2023-05-19 PROCEDURE — 99213 PR OFFICE/OUTPT VISIT, EST, LEVL III, 20-29 MIN: ICD-10-PCS | Mod: 25,TH,S$GLB, | Performed by: OBSTETRICS & GYNECOLOGY

## 2023-05-19 PROCEDURE — 99213 OFFICE O/P EST LOW 20 MIN: CPT | Mod: 25,TH,S$GLB, | Performed by: OBSTETRICS & GYNECOLOGY

## 2023-05-19 PROCEDURE — 90715 TDAP VACCINE GREATER THAN OR EQUAL TO 7YO IM: ICD-10-PCS | Mod: S$GLB,,, | Performed by: OBSTETRICS & GYNECOLOGY

## 2023-05-19 NOTE — PROGRESS NOTES
Subjective:       Patient ID: Ricarda Daugherty is a 28 y.o.  at 29w4d     Chief Complaint:  No chief complaint on file.      History of Present Illness  No complaints. Labs and history reviewed with pt.         Review of Systems  Denies n/v, f/c, dysuria, contractions,   VD, VB, round ligament pain, headaches       Objective:     Vitals:    23 1034   BP: 99/65   Pulse: 99     Wt Readings from Last 3 Encounters:   23 73.9 kg (163 lb)   23 75.3 kg (166 lb)   23 73.5 kg (162 lb)     nad  NCAT  pupils normal size  Skin nml no rashes or lesions  No resp distress, resp even and unlabored   nt, nd,  no rebound no guarding  No cyanosis or clubbing, edema appropriate for pregn  FHT: 140s   Assessment:        1. Encounter for supervision of other normal pregnancy in third trimester                Plan:       Tdap, cbc today   Anatomy normal, needs c-spine    Encouraged PNV  Pain, fever, bleeding precautions   RTC 4 weeks

## 2023-05-22 ENCOUNTER — PATIENT MESSAGE (OUTPATIENT)
Dept: OTHER | Facility: OTHER | Age: 28
End: 2023-05-22
Payer: MEDICAID

## 2023-05-31 DIAGNOSIS — Z34.83 ENCOUNTER FOR SUPERVISION OF OTHER NORMAL PREGNANCY IN THIRD TRIMESTER: Primary | ICD-10-CM

## 2023-06-05 ENCOUNTER — PATIENT MESSAGE (OUTPATIENT)
Dept: OTHER | Facility: OTHER | Age: 28
End: 2023-06-05
Payer: MEDICAID

## 2023-06-05 LAB
AMORPHOUS URINE: ABNORMAL /LPF
APPEARANCE, UA: ABNORMAL
BACTERIA SPEC CULT: ABNORMAL /HPF
BILIRUB UR QL STRIP: NEGATIVE MG/DL
COLOR UR: ABNORMAL
GLUCOSE (UA): NORMAL MG/DL
HGB UR QL STRIP: NEGATIVE /UL
KETONES UR QL STRIP: ABNORMAL MG/DL
LEUKOCYTE ESTERASE UR QL STRIP: 25 /UL
NITRITE UR QL STRIP: NEGATIVE
PH UR STRIP: 7 PH (ref 5–9)
PROT UR QL STRIP: ABNORMAL MG/DL
RBC #/AREA URNS HPF: ABNORMAL /HPF (ref 0–2)
SERVICE COMMENT 03: ABNORMAL
SP GR UR STRIP: 1.01 (ref 1–1.03)
SPECIMEN COLLECTION METHOD, URINE: ABNORMAL
SQUAMOUS EPITHELIAL, UA: ABNORMAL /LPF
UROBILINOGEN UR STRIP-ACNC: NORMAL MG/DL
WBC #/AREA URNS HPF: ABNORMAL /HPF (ref 0–5)

## 2023-06-09 ENCOUNTER — ROUTINE PRENATAL (OUTPATIENT)
Dept: OBSTETRICS AND GYNECOLOGY | Facility: CLINIC | Age: 28
End: 2023-06-09
Payer: MEDICAID

## 2023-06-09 ENCOUNTER — PROCEDURE VISIT (OUTPATIENT)
Dept: OBSTETRICS AND GYNECOLOGY | Facility: CLINIC | Age: 28
End: 2023-06-09
Payer: MEDICAID

## 2023-06-09 VITALS
BODY MASS INDEX: 32.03 KG/M2 | SYSTOLIC BLOOD PRESSURE: 117 MMHG | HEART RATE: 86 BPM | DIASTOLIC BLOOD PRESSURE: 77 MMHG | WEIGHT: 164 LBS

## 2023-06-09 DIAGNOSIS — Z34.83 ENCOUNTER FOR SUPERVISION OF OTHER NORMAL PREGNANCY IN THIRD TRIMESTER: ICD-10-CM

## 2023-06-09 DIAGNOSIS — O47.03 PRETERM UTERINE CONTRACTIONS IN THIRD TRIMESTER, ANTEPARTUM: Primary | ICD-10-CM

## 2023-06-09 PROCEDURE — 99213 OFFICE O/P EST LOW 20 MIN: CPT | Mod: TH,S$GLB,, | Performed by: OBSTETRICS & GYNECOLOGY

## 2023-06-09 PROCEDURE — 76816 US OB/GYN PROCEDURE (VIEWPOINT): ICD-10-PCS | Mod: S$GLB,,, | Performed by: OBSTETRICS & GYNECOLOGY

## 2023-06-09 PROCEDURE — 76816 OB US FOLLOW-UP PER FETUS: CPT | Mod: S$GLB,,, | Performed by: OBSTETRICS & GYNECOLOGY

## 2023-06-09 PROCEDURE — 99213 PR OFFICE/OUTPT VISIT, EST, LEVL III, 20-29 MIN: ICD-10-PCS | Mod: TH,S$GLB,, | Performed by: OBSTETRICS & GYNECOLOGY

## 2023-06-09 RX ORDER — HYDROXYZINE HYDROCHLORIDE 25 MG/1
25 TABLET, FILM COATED ORAL 3 TIMES DAILY
Qty: 30 TABLET | Refills: 1 | Status: SHIPPED | OUTPATIENT
Start: 2023-06-09

## 2023-06-09 NOTE — PROGRESS NOTES
Subjective:       Patient ID: Ricarda Daugherty is a 28 y.o.  at 32w4d     Chief Complaint:  Routine Prenatal Visit      History of Present Illness  C/o cxts . Labs and history reviewed with pt.         Review of Systems  Denies n/v, f/c, dysuria, contractions,   VD, VB, round ligament pain, headaches       Objective:     Vitals:    23 1014   BP: 117/77   Pulse: 86     Wt Readings from Last 3 Encounters:   23 74.4 kg (164 lb)   23 73.9 kg (163 lb)   23 75.3 kg (166 lb)     nad  NCAT  pupils normal size  Skin nml no rashes or lesions  No resp distress, resp even and unlabored   nt, nd,  no rebound no guarding  No cyanosis or clubbing, edema appropriate for pregn  FHT: 140s   Assessment:        No diagnosis found.            Plan:       cvx closed   atartax for cxts   Encouraged PNV  Pain, fever, bleeding precautions   RTC 4 weeks

## 2023-06-12 ENCOUNTER — TELEPHONE (OUTPATIENT)
Dept: OBSTETRICS AND GYNECOLOGY | Facility: CLINIC | Age: 28
End: 2023-06-12
Payer: MEDICAID

## 2023-06-12 NOTE — TELEPHONE ENCOUNTER
----- Message from Aisha Arreguin sent at 6/12/2023 11:02 AM CDT -----  Contact: self  Type:  Same Day Appointment Request  Caller is requesting a same day appointment.  Caller declined first available appointment listed below.    Name of Caller:Ricarda Daugherty  When is the first available appointment? N/a  Symptoms:Miscues plug has broken water has not broken  Best Call Back Number:[phone]  Additional Information:  n/a  Dr Pallavi Tafoya  MRN [id number]

## 2023-06-12 NOTE — TELEPHONE ENCOUNTER
Spoke with patient.  She states she believes she has lost her mucus plug and she is having contractions. I informed her that dr. Tafoya is not in this week and that she should go to the labor and delivery unit to be evaluated. Patient verbalized understanding.

## 2023-06-19 LAB
APPEARANCE, UA: CLEAR
BACTERIA SPEC CULT: ABNORMAL /HPF
BILIRUB UR QL STRIP: NEGATIVE MG/DL
COLOR UR: ABNORMAL
GLUCOSE (UA): NORMAL MG/DL
HGB UR QL STRIP: NEGATIVE /UL
KETONES UR QL STRIP: NEGATIVE MG/DL
LEUKOCYTE ESTERASE UR QL STRIP: 25 /UL
NITRITE UR QL STRIP: NEGATIVE
PH UR STRIP: 8 PH (ref 5–9)
PROT UR QL STRIP: NEGATIVE MG/DL
RBC #/AREA URNS HPF: ABNORMAL /HPF (ref 0–2)
SERVICE COMMENT 03: ABNORMAL
SP GR UR STRIP: 1.01 (ref 1–1.03)
SPECIMEN COLLECTION METHOD, URINE: ABNORMAL
SQUAMOUS EPITHELIAL, UA: ABNORMAL /LPF
UROBILINOGEN UR STRIP-ACNC: NORMAL MG/DL
WBC #/AREA URNS HPF: ABNORMAL /HPF (ref 0–5)

## 2023-06-22 ENCOUNTER — ROUTINE PRENATAL (OUTPATIENT)
Dept: OBSTETRICS AND GYNECOLOGY | Facility: CLINIC | Age: 28
End: 2023-06-22
Payer: MEDICAID

## 2023-06-22 VITALS
SYSTOLIC BLOOD PRESSURE: 114 MMHG | BODY MASS INDEX: 32.22 KG/M2 | HEART RATE: 98 BPM | WEIGHT: 165 LBS | DIASTOLIC BLOOD PRESSURE: 71 MMHG

## 2023-06-22 DIAGNOSIS — O47.03 PRETERM UTERINE CONTRACTIONS IN THIRD TRIMESTER, ANTEPARTUM: Primary | ICD-10-CM

## 2023-06-22 DIAGNOSIS — Z34.83 ENCOUNTER FOR SUPERVISION OF OTHER NORMAL PREGNANCY IN THIRD TRIMESTER: ICD-10-CM

## 2023-06-22 PROCEDURE — 99213 OFFICE O/P EST LOW 20 MIN: CPT | Mod: TH,S$GLB,, | Performed by: OBSTETRICS & GYNECOLOGY

## 2023-06-22 PROCEDURE — 99213 PR OFFICE/OUTPT VISIT, EST, LEVL III, 20-29 MIN: ICD-10-PCS | Mod: TH,S$GLB,, | Performed by: OBSTETRICS & GYNECOLOGY

## 2023-06-22 NOTE — PROGRESS NOTES
Subjective:       Patient ID: Ricarda Daugherty is a 28 y.o.  at 34w3d     Chief Complaint:  Routine Prenatal Visit      History of Present Illness  C/o cxts . Labs and history reviewed with pt.         Review of Systems  Denies n/v, f/c, dysuria, contractions,   VD, VB, round ligament pain, headaches       Objective:     Vitals:    23 0847   BP: 114/71   Pulse: 98       Wt Readings from Last 3 Encounters:   23 74.8 kg (165 lb)   23 74.4 kg (164 lb)   23 73.9 kg (163 lb)     nad  NCAT  pupils normal size  Skin nml no rashes or lesions  No resp distress, resp even and unlabored   nt, nd,  no rebound no guarding  No cyanosis or clubbing, edema appropriate for pregn  FHT: 140s   Assessment:        1.  uterine contractions in third trimester, antepartum    2. Encounter for supervision of other normal pregnancy in third trimester                Plan:         atartax for cxts   Encouraged PNV  Pain, fever, bleeding precautions   RTC 2 weeks

## 2023-06-26 ENCOUNTER — PATIENT MESSAGE (OUTPATIENT)
Dept: OTHER | Facility: OTHER | Age: 28
End: 2023-06-26
Payer: MEDICAID

## 2023-07-06 ENCOUNTER — ROUTINE PRENATAL (OUTPATIENT)
Dept: OBSTETRICS AND GYNECOLOGY | Facility: CLINIC | Age: 28
End: 2023-07-06
Payer: MEDICAID

## 2023-07-06 VITALS
WEIGHT: 165 LBS | SYSTOLIC BLOOD PRESSURE: 102 MMHG | BODY MASS INDEX: 32.22 KG/M2 | DIASTOLIC BLOOD PRESSURE: 66 MMHG | HEART RATE: 66 BPM

## 2023-07-06 DIAGNOSIS — Z34.83 ENCOUNTER FOR SUPERVISION OF OTHER NORMAL PREGNANCY IN THIRD TRIMESTER: Primary | ICD-10-CM

## 2023-07-06 PROCEDURE — 99213 PR OFFICE/OUTPT VISIT, EST, LEVL III, 20-29 MIN: ICD-10-PCS | Mod: TH,S$GLB,, | Performed by: OBSTETRICS & GYNECOLOGY

## 2023-07-06 PROCEDURE — 99213 OFFICE O/P EST LOW 20 MIN: CPT | Mod: TH,S$GLB,, | Performed by: OBSTETRICS & GYNECOLOGY

## 2023-07-06 NOTE — PROGRESS NOTES
Subjective:       Patient ID: Ricarda Daugherty is a 28 y.o.  at 36w3d     Chief Complaint:  Routine Prenatal Visit      History of Present Illness  C/o cxts . Labs and history reviewed with pt.         Review of Systems  Denies n/v, f/c, dysuria, contractions,   VD, VB, round ligament pain, headaches       Objective:     Vitals:    23 0924   BP: 102/66   Pulse: 66       Wt Readings from Last 3 Encounters:   23 74.8 kg (165 lb)   23 74.8 kg (165 lb)   23 74.4 kg (164 lb)     nad  NCAT  pupils normal size  Skin nml no rashes or lesions  No resp distress, resp even and unlabored   nt, nd,  no rebound no guarding  No cyanosis or clubbing, edema appropriate for pregn  FHT: 140s   Assessment:        1. Encounter for supervision of other normal pregnancy in third trimester                Plan:         gbs today   Encouraged PNV  Pain, fever, bleeding precautions   RTC 2 weeks

## 2023-07-07 ENCOUNTER — TELEPHONE (OUTPATIENT)
Dept: OBSTETRICS AND GYNECOLOGY | Facility: CLINIC | Age: 28
End: 2023-07-07
Payer: MEDICAID

## 2023-07-07 LAB
GROUP B STREP MOLECULAR: NEGATIVE
PENICILLIN ALLERGIC: NO

## 2023-07-07 NOTE — TELEPHONE ENCOUNTER
----- Message from Thi Shelton sent at 7/7/2023  8:20 AM CDT -----  Contact: self  Type: #OB#    Patient Name: Ricarda Daugherty   MRN: [id number]  Best Call Back Number: [phone]  Patient's Provider: Michelet  How far along is the patient?: 36 wks  Nature of the Call: Had bleeding last night when she wiped, some bright red blood and some contractions  Additional Information: N/a

## 2023-07-07 NOTE — TELEPHONE ENCOUNTER
I called pt and advised her that it is normal to have some bleeding after being check yesterday. If she starts to fill a pad full of blood she needs to come into labor and delivery. Pt understood.

## 2023-07-13 ENCOUNTER — PATIENT MESSAGE (OUTPATIENT)
Dept: OBSTETRICS AND GYNECOLOGY | Facility: CLINIC | Age: 28
End: 2023-07-13

## 2023-07-13 ENCOUNTER — ROUTINE PRENATAL (OUTPATIENT)
Dept: OBSTETRICS AND GYNECOLOGY | Facility: CLINIC | Age: 28
End: 2023-07-13
Payer: MEDICAID

## 2023-07-13 ENCOUNTER — TELEPHONE (OUTPATIENT)
Dept: OBSTETRICS AND GYNECOLOGY | Facility: CLINIC | Age: 28
End: 2023-07-13

## 2023-07-13 VITALS
WEIGHT: 165 LBS | HEART RATE: 85 BPM | SYSTOLIC BLOOD PRESSURE: 117 MMHG | BODY MASS INDEX: 32.22 KG/M2 | DIASTOLIC BLOOD PRESSURE: 78 MMHG

## 2023-07-13 DIAGNOSIS — Z34.83 ENCOUNTER FOR SUPERVISION OF OTHER NORMAL PREGNANCY IN THIRD TRIMESTER: Primary | ICD-10-CM

## 2023-07-13 LAB
APPEARANCE, UA: CLEAR
BACTERIA SPEC CULT: ABNORMAL /HPF
BASOPHILS NFR BLD: 0.4 % (ref 0–3)
BILIRUB UR QL STRIP: NEGATIVE MG/DL
COLOR UR: ABNORMAL
EOSINOPHIL NFR BLD: 2.8 % (ref 1–3)
ERYTHROCYTE [DISTWIDTH] IN BLOOD BY AUTOMATED COUNT: 14.1 % (ref 12.5–18)
GLUCOSE (UA): NORMAL MG/DL
HCT VFR BLD AUTO: 33.2 % (ref 37–47)
HGB BLD-MCNC: 10.6 G/DL (ref 12–16)
HGB UR QL STRIP: 10 /UL
KETONES UR QL STRIP: 150 MG/DL
LEUKOCYTE ESTERASE UR QL STRIP: NEGATIVE /UL
LYMPHOCYTES NFR BLD: 10.6 % (ref 25–40)
MCH RBC QN AUTO: 25.1 PG (ref 27–31.2)
MCHC RBC AUTO-ENTMCNC: 31.9 G/DL (ref 31.8–35.4)
MCV RBC AUTO: 78.7 FL (ref 80–97)
MONOCYTES NFR BLD: 6.2 % (ref 1–15)
NEUTROPHILS # BLD AUTO: 7.5 10*3/UL (ref 1.8–7.7)
NEUTROPHILS NFR BLD: 79.4 % (ref 37–80)
NITRITE UR QL STRIP: NEGATIVE
NUCLEATED RED BLOOD CELLS: 0 %
PH UR STRIP: 6.5 PH (ref 5–9)
PLATELETS: 212 10*3/UL (ref 142–424)
PROT UR QL STRIP: 30 MG/DL
RBC # BLD AUTO: 4.22 10*6/UL (ref 4.2–5.4)
RBC #/AREA URNS HPF: ABNORMAL /HPF (ref 0–2)
SERVICE COMMENT 03: ABNORMAL
SP GR UR STRIP: 1.02 (ref 1–1.03)
SPECIMEN COLLECTION METHOD, URINE: ABNORMAL
SQUAMOUS EPITHELIAL, UA: ABNORMAL /LPF
UROBILINOGEN UR STRIP-ACNC: NORMAL MG/DL
WBC # BLD: 9.5 10*3/UL (ref 4.6–10.2)
WBC #/AREA URNS HPF: ABNORMAL /HPF (ref 0–5)

## 2023-07-13 PROCEDURE — 99213 PR OFFICE/OUTPT VISIT, EST, LEVL III, 20-29 MIN: ICD-10-PCS | Mod: TH,S$GLB,, | Performed by: OBSTETRICS & GYNECOLOGY

## 2023-07-13 PROCEDURE — 99213 OFFICE O/P EST LOW 20 MIN: CPT | Mod: TH,S$GLB,, | Performed by: OBSTETRICS & GYNECOLOGY

## 2023-07-13 NOTE — TELEPHONE ENCOUNTER
----- Message from Saige Brown sent at 7/13/2023 12:27 PM CDT -----  Contact: self  Pt is having contractions, and wants to know should she go hospital pls call [phone]

## 2023-07-13 NOTE — PROGRESS NOTES
Subjective:       Patient ID: Ricarda Daugherty is a 28 y.o.  at 37w3d     Chief Complaint:  Routine Prenatal Visit      History of Present Illness  C/o cxts . Labs and history reviewed with pt.         Review of Systems  Denies n/v, f/c, dysuria, contractions,   VD, VB, round ligament pain, headaches       Objective:     Vitals:    23 0849   BP: 117/78   Pulse: 85       Wt Readings from Last 3 Encounters:   23 74.8 kg (165 lb)   23 74.8 kg (165 lb)   23 74.8 kg (165 lb)     nad  NCAT  pupils normal size  Skin nml no rashes or lesions  No resp distress, resp even and unlabored   nt, nd,  no rebound no guarding  No cyanosis or clubbing, edema appropriate for pregn  FHT: 140s   Assessment:        No diagnosis found.            Plan:       /high - membranes stripped   Encouraged PNV  Pain, fever, bleeding precautions   RTC 2 weeks

## 2023-07-13 NOTE — TELEPHONE ENCOUNTER
Tried returning phone call to patient but the voice mailbox is not set up. Could not leave a voicemail. Will try messaging on the portal.

## 2023-07-14 ENCOUNTER — OUTSIDE PLACE OF SERVICE (OUTPATIENT)
Dept: OBSTETRICS AND GYNECOLOGY | Facility: CLINIC | Age: 28
End: 2023-07-14
Payer: MEDICAID

## 2023-07-14 PROCEDURE — 59409 PR OBSTETRICAL CARE,VAG DELIV ONLY: ICD-10-PCS | Mod: AT,,, | Performed by: OBSTETRICS & GYNECOLOGY

## 2023-07-14 PROCEDURE — 59409 OBSTETRICAL CARE: CPT | Mod: AT,,, | Performed by: OBSTETRICS & GYNECOLOGY

## 2023-07-15 ENCOUNTER — OUTSIDE PLACE OF SERVICE (OUTPATIENT)
Dept: OBSTETRICS AND GYNECOLOGY | Facility: CLINIC | Age: 28
End: 2023-07-15
Payer: MEDICAID

## 2023-07-15 PROCEDURE — 99238 PR HOSPITAL DISCHARGE DAY,<30 MIN: ICD-10-PCS | Mod: ,,, | Performed by: OBSTETRICS & GYNECOLOGY

## 2023-07-15 PROCEDURE — 99238 HOSP IP/OBS DSCHRG MGMT 30/<: CPT | Mod: ,,, | Performed by: OBSTETRICS & GYNECOLOGY

## 2024-07-18 DIAGNOSIS — Z34.91 ENCOUNTER FOR PREGNANCY RELATED EXAMINATION IN FIRST TRIMESTER: Primary | ICD-10-CM

## 2024-08-09 ENCOUNTER — PATIENT MESSAGE (OUTPATIENT)
Dept: OBSTETRICS AND GYNECOLOGY | Facility: CLINIC | Age: 29
End: 2024-08-09
Payer: MEDICAID

## 2024-08-09 ENCOUNTER — PROCEDURE VISIT (OUTPATIENT)
Dept: OBSTETRICS AND GYNECOLOGY | Facility: CLINIC | Age: 29
End: 2024-08-09
Payer: MEDICAID

## 2024-08-09 DIAGNOSIS — Z34.91 ENCOUNTER FOR PREGNANCY RELATED EXAMINATION IN FIRST TRIMESTER: ICD-10-CM

## 2024-08-09 PROCEDURE — 76801 OB US < 14 WKS SINGLE FETUS: CPT | Mod: ,,, | Performed by: OBSTETRICS & GYNECOLOGY

## 2024-09-05 ENCOUNTER — INITIAL PRENATAL (OUTPATIENT)
Dept: OBSTETRICS AND GYNECOLOGY | Facility: CLINIC | Age: 29
End: 2024-09-05
Payer: MEDICAID

## 2024-09-05 VITALS
SYSTOLIC BLOOD PRESSURE: 109 MMHG | WEIGHT: 147 LBS | BODY MASS INDEX: 28.71 KG/M2 | DIASTOLIC BLOOD PRESSURE: 75 MMHG | HEART RATE: 73 BPM

## 2024-09-05 DIAGNOSIS — Z34.91 ENCOUNTER FOR PREGNANCY RELATED EXAMINATION IN FIRST TRIMESTER: Primary | ICD-10-CM

## 2024-09-05 NOTE — PROGRESS NOTES
Subjective:       Patient ID: Ricarda Daugherty is a 29 y.o.  at 12w1d   Chief Complaint:  Initial Prenatal Visit      History of Present Illness  here for new ob exam.  Labs and history were reviewed with the patient today  No complaints      No past medical history on file.    No past surgical history on file.    OB:    OB History    Para Term  AB Living   6 4 4     4   SAB IAB Ectopic Multiple Live Births           4      # Outcome Date GA Lbr Abilio/2nd Weight Sex Type Anes PTL Lv   6 Current            5 Term  37w2d    Vag-Spont EPI  MARICARMEN   4 Term 18 39w2d  3.374 kg (7 lb 7 oz) F    MARICARMEN   3 Term    3.204 kg (7 lb 1 oz) M Vag-Spont   MARICARMEN   2             1 Term      Vag-Spont  N MARICARMEN     Gyn: no STD, never had abn pap   Meds:   Current Outpatient Medications:     hydrOXYzine HCL (ATARAX) 25 MG tablet, Take 1 tablet (25 mg total) by mouth 3 (three) times daily., Disp: 30 tablet, Rfl: 1    prenatal vit calc,iron,folic (PRENATAL VITAMIN ORAL), Prenatal, Disp: , Rfl:     All: Review of patient's allergies indicates:  No Known Allergies    SH:   Social History     Tobacco Use    Smoking status: Never    Smokeless tobacco: Not on file   Substance Use Topics    Alcohol use: Not on file      FH: family history is not on file.      Review of Systems  nml 1st trimester sx- sob, dec excercixe tolerance, fatigue and nausea  Neg for vag bleed, dc, vomiting, cp, lof, fever, chills, ns, visual changes, swelling, headaches, constipation/diarrhea, dysuria, freq/urgency of urination     Objective:     Vitals:    24 0915   BP: 109/75   Pulse: 73   Weight: 66.7 kg (147 lb)       NAD  NCAT  pupils normal size  Skin nml no rashes or lesions  No resp distress, resp even and unlabored  nt nd, no rebound no guarding  nml ext fem gent, normal cvx uterus and adnexa, no dc or bleeding  nml appearing rectum  No cyanosis or clubbing, edema appropriate for pregn    Uterus size approp for gest  age         Assessment:        1. Encounter for pregnancy related examination in first trimester               Plan:      Encounter for pregnancy related examination in first trimester  -     Trichomonas Vaginalis, LISANDRA  -     C. trachomatis/N. gonorrhoeae by AMP DNA [facility]; Cervix         Nipt ordered   Pain fever bleeding precautions  Encouraged PNV  rtc 4 wks

## 2024-10-02 ENCOUNTER — PATIENT MESSAGE (OUTPATIENT)
Dept: OTHER | Facility: OTHER | Age: 29
End: 2024-10-02
Payer: MEDICAID

## 2024-10-03 ENCOUNTER — ROUTINE PRENATAL (OUTPATIENT)
Dept: OBSTETRICS AND GYNECOLOGY | Facility: CLINIC | Age: 29
End: 2024-10-03
Payer: MEDICAID

## 2024-10-03 VITALS
WEIGHT: 149.38 LBS | HEART RATE: 81 BPM | DIASTOLIC BLOOD PRESSURE: 77 MMHG | BODY MASS INDEX: 29.18 KG/M2 | SYSTOLIC BLOOD PRESSURE: 110 MMHG

## 2024-10-03 DIAGNOSIS — Z34.91 ENCOUNTER FOR PREGNANCY RELATED EXAMINATION IN FIRST TRIMESTER: Primary | ICD-10-CM

## 2024-10-03 PROCEDURE — 99213 OFFICE O/P EST LOW 20 MIN: CPT | Mod: TH,S$PBB,, | Performed by: OBSTETRICS & GYNECOLOGY

## 2024-10-03 NOTE — PROGRESS NOTES
Subjective:       Patient ID: Ricarda Daugherty is a 29 y.o.  at 16w1d     Chief Complaint:  Routine Prenatal Visit      History of Present Illness  No complaints. Labs and history reviewed with pt.         Review of Systems  Denies n/v, f/c, dysuria, contractions,   VD, VB, round ligament pain, headaches       Objective:     Vitals:    10/03/24 0925   BP: 110/77   Pulse: 81     Wt Readings from Last 3 Encounters:   10/03/24 67.8 kg (149 lb 6.4 oz)   24 66.7 kg (147 lb)   23 74.8 kg (165 lb)      nad  NCAT  pupils normal size  Skin nml no rashes or lesions  No resp distress, resp even and unlabored   nt, nd,  no rebound no guarding  No cyanosis or clubbing, edema appropriate for pregn  FHT: 150s   Assessment:        1. Encounter for pregnancy related examination in first trimester              Plan:         Encouraged PNV  Pain, fever, bleeding precautions   RTC 4 weeks

## 2024-10-09 ENCOUNTER — PATIENT MESSAGE (OUTPATIENT)
Dept: OTHER | Facility: OTHER | Age: 29
End: 2024-10-09
Payer: MEDICAID

## 2024-10-17 ENCOUNTER — PATIENT MESSAGE (OUTPATIENT)
Dept: OBSTETRICS AND GYNECOLOGY | Facility: CLINIC | Age: 29
End: 2024-10-17
Payer: MEDICAID

## 2024-10-28 DIAGNOSIS — Z34.92 ENCOUNTER FOR PREGNANCY RELATED EXAMINATION IN SECOND TRIMESTER: Primary | ICD-10-CM

## 2024-10-30 ENCOUNTER — PATIENT MESSAGE (OUTPATIENT)
Dept: OTHER | Facility: OTHER | Age: 29
End: 2024-10-30
Payer: MEDICAID

## 2024-10-31 ENCOUNTER — ROUTINE PRENATAL (OUTPATIENT)
Dept: OBSTETRICS AND GYNECOLOGY | Facility: CLINIC | Age: 29
End: 2024-10-31
Payer: MEDICAID

## 2024-10-31 ENCOUNTER — PROCEDURE VISIT (OUTPATIENT)
Dept: OBSTETRICS AND GYNECOLOGY | Facility: CLINIC | Age: 29
End: 2024-10-31
Payer: MEDICAID

## 2024-10-31 VITALS
HEART RATE: 98 BPM | SYSTOLIC BLOOD PRESSURE: 101 MMHG | WEIGHT: 153.19 LBS | BODY MASS INDEX: 29.92 KG/M2 | DIASTOLIC BLOOD PRESSURE: 61 MMHG

## 2024-10-31 DIAGNOSIS — Z34.92 ENCOUNTER FOR PREGNANCY RELATED EXAMINATION IN SECOND TRIMESTER: Primary | ICD-10-CM

## 2024-10-31 DIAGNOSIS — Z34.92 ENCOUNTER FOR PREGNANCY RELATED EXAMINATION IN SECOND TRIMESTER: ICD-10-CM

## 2024-10-31 PROCEDURE — 76805 OB US >/= 14 WKS SNGL FETUS: CPT | Mod: 26,S$PBB,, | Performed by: OBSTETRICS & GYNECOLOGY

## 2024-10-31 PROCEDURE — 99213 OFFICE O/P EST LOW 20 MIN: CPT | Mod: TH,S$PBB,, | Performed by: OBSTETRICS & GYNECOLOGY

## 2024-11-27 ENCOUNTER — PATIENT MESSAGE (OUTPATIENT)
Dept: OTHER | Facility: OTHER | Age: 29
End: 2024-11-27
Payer: MEDICAID

## 2024-12-04 DIAGNOSIS — Z34.92 ENCOUNTER FOR PREGNANCY RELATED EXAMINATION IN SECOND TRIMESTER: Primary | ICD-10-CM

## 2024-12-11 ENCOUNTER — PATIENT MESSAGE (OUTPATIENT)
Dept: OBSTETRICS AND GYNECOLOGY | Facility: CLINIC | Age: 29
End: 2024-12-11
Payer: MEDICAID

## 2024-12-11 ENCOUNTER — PATIENT MESSAGE (OUTPATIENT)
Dept: OTHER | Facility: OTHER | Age: 29
End: 2024-12-11
Payer: MEDICAID

## 2024-12-16 ENCOUNTER — ROUTINE PRENATAL (OUTPATIENT)
Dept: OBSTETRICS AND GYNECOLOGY | Facility: CLINIC | Age: 29
End: 2024-12-16
Payer: MEDICAID

## 2024-12-16 ENCOUNTER — PROCEDURE VISIT (OUTPATIENT)
Dept: OBSTETRICS AND GYNECOLOGY | Facility: CLINIC | Age: 29
End: 2024-12-16
Payer: MEDICAID

## 2024-12-16 VITALS
DIASTOLIC BLOOD PRESSURE: 68 MMHG | HEART RATE: 74 BPM | WEIGHT: 165 LBS | BODY MASS INDEX: 32.22 KG/M2 | SYSTOLIC BLOOD PRESSURE: 103 MMHG

## 2024-12-16 DIAGNOSIS — Z34.92 ENCOUNTER FOR PREGNANCY RELATED EXAMINATION IN SECOND TRIMESTER: ICD-10-CM

## 2024-12-16 DIAGNOSIS — Z34.93 ENCOUNTER FOR PREGNANCY RELATED EXAMINATION IN THIRD TRIMESTER: Primary | ICD-10-CM

## 2024-12-16 LAB — GLUCOSE 1 HR POST 50 GM: 87 MG/DL

## 2024-12-16 PROCEDURE — 99213 OFFICE O/P EST LOW 20 MIN: CPT | Mod: TH,S$PBB,, | Performed by: OBSTETRICS & GYNECOLOGY

## 2024-12-16 NOTE — PROGRESS NOTES
Subjective:       Patient ID: Ricarda Daugherty is a 29 y.o.  at 26w5d      Chief Complaint:  Routine Prenatal Visit      History of Present Illness  No complaints. Labs and history reviewed with pt.         Review of Systems  Denies n/v, f/c, dysuria, contractions,   VD, VB, round ligament pain, headaches       Objective:     Vitals:    24 0934   BP: 103/68   Pulse: 74     Wt Readings from Last 3 Encounters:   10/03/24 67.8 kg (149 lb 6.4 oz)   24 66.7 kg (147 lb)   23 74.8 kg (165 lb)      nad  NCAT  pupils normal size  Skin nml no rashes or lesions  No resp distress, resp even and unlabored   nt, nd,  no rebound no guarding  No cyanosis or clubbing, edema appropriate for pregn  FHT: 150s   Assessment:        1. Encounter for pregnancy related examination in third trimester                Plan:      Anatomy normal today   O'sul ordered   Encouraged PNV  Pain, fever, bleeding precautions   RTC 4 weeks

## 2024-12-18 DIAGNOSIS — R12 HEARTBURN IN PREGNANCY IN FIRST TRIMESTER: Primary | ICD-10-CM

## 2024-12-18 DIAGNOSIS — O26.891 HEARTBURN IN PREGNANCY IN FIRST TRIMESTER: Primary | ICD-10-CM

## 2024-12-18 RX ORDER — PANTOPRAZOLE SODIUM 40 MG/1
40 TABLET, DELAYED RELEASE ORAL DAILY
Qty: 90 TABLET | Refills: 3 | Status: SHIPPED | OUTPATIENT
Start: 2024-12-18 | End: 2025-12-18

## 2024-12-25 ENCOUNTER — PATIENT MESSAGE (OUTPATIENT)
Dept: OTHER | Facility: OTHER | Age: 29
End: 2024-12-25
Payer: MEDICAID

## 2024-12-29 LAB
APPEARANCE, UA: ABNORMAL
BACTERIA SPEC CULT: ABNORMAL /HPF
BILIRUB UR QL STRIP: NEGATIVE MG/DL
COLOR UR: ABNORMAL
GLUCOSE (UA): NORMAL MG/DL
HGB UR QL STRIP: 10 /UL
KETONES UR QL STRIP: NEGATIVE MG/DL
LEUKOCYTE ESTERASE UR QL STRIP: 500 /UL
NITRITE UR QL STRIP: NEGATIVE
PH UR STRIP: 6 PH (ref 5–8)
PROT UR QL STRIP: 30 MG/DL
RBC #/AREA URNS HPF: ABNORMAL /HPF (ref 0–2)
SP GR UR STRIP: 1.02 (ref 1–1.03)
SPECIMEN COLLECTION METHOD, URINE: ABNORMAL
SQUAMOUS EPITHELIAL, UA: ABNORMAL /LPF
UROBILINOGEN UR STRIP-ACNC: NORMAL MG/DL
WBC #/AREA URNS HPF: ABNORMAL /HPF (ref 0–5)

## 2025-01-08 ENCOUNTER — PATIENT MESSAGE (OUTPATIENT)
Dept: OTHER | Facility: OTHER | Age: 30
End: 2025-01-08
Payer: MEDICAID

## 2025-01-14 ENCOUNTER — PATIENT MESSAGE (OUTPATIENT)
Dept: OBSTETRICS AND GYNECOLOGY | Facility: CLINIC | Age: 30
End: 2025-01-14

## 2025-01-14 ENCOUNTER — ROUTINE PRENATAL (OUTPATIENT)
Dept: OBSTETRICS AND GYNECOLOGY | Facility: CLINIC | Age: 30
End: 2025-01-14
Payer: MEDICAID

## 2025-01-14 ENCOUNTER — PROCEDURE VISIT (OUTPATIENT)
Dept: OBSTETRICS AND GYNECOLOGY | Facility: CLINIC | Age: 30
End: 2025-01-14
Payer: MEDICAID

## 2025-01-14 VITALS
SYSTOLIC BLOOD PRESSURE: 107 MMHG | HEART RATE: 110 BPM | DIASTOLIC BLOOD PRESSURE: 70 MMHG | BODY MASS INDEX: 31.29 KG/M2 | WEIGHT: 160.19 LBS

## 2025-01-14 DIAGNOSIS — O40.9XX0 POLYHYDRAMNIOS AFFECTING PREGNANCY: ICD-10-CM

## 2025-01-14 DIAGNOSIS — Z34.93 ENCOUNTER FOR PREGNANCY RELATED EXAMINATION IN THIRD TRIMESTER: Primary | ICD-10-CM

## 2025-01-14 DIAGNOSIS — O40.9XX0 POLYHYDRAMNIOS AFFECTING PREGNANCY: Primary | ICD-10-CM

## 2025-01-14 DIAGNOSIS — R05.1 ACUTE COUGH: ICD-10-CM

## 2025-01-14 DIAGNOSIS — Z34.93 ENCOUNTER FOR PREGNANCY RELATED EXAMINATION IN THIRD TRIMESTER: ICD-10-CM

## 2025-01-14 PROCEDURE — 99213 OFFICE O/P EST LOW 20 MIN: CPT | Mod: TH,S$PBB,, | Performed by: OBSTETRICS & GYNECOLOGY

## 2025-01-14 PROCEDURE — 76816 OB US FOLLOW-UP PER FETUS: CPT | Mod: 26,S$PBB,, | Performed by: OBSTETRICS & GYNECOLOGY

## 2025-01-14 RX ORDER — CODEINE PHOSPHATE AND GUAIFENESIN 10; 100 MG/5ML; MG/5ML
5 SOLUTION ORAL 3 TIMES DAILY PRN
Qty: 150 ML | Refills: 0 | Status: SHIPPED | OUTPATIENT
Start: 2025-01-14 | End: 2025-01-24

## 2025-01-14 NOTE — PROGRESS NOTES
Subjective:       Patient ID: Ricarda Daugherty is a 30 y.o.  at 30w6d       Chief Complaint:  Routine Prenatal Visit      History of Present Illness  No complaints. Labs and history reviewed with pt.         Review of Systems  Denies n/v, f/c, dysuria, contractions,   VD, VB, round ligament pain, headaches       Objective:     Vitals:    25 0915   BP: 107/70   Pulse: 110     Wt Readings from Last 3 Encounters:   10/03/24 67.8 kg (149 lb 6.4 oz)   24 66.7 kg (147 lb)   23 74.8 kg (165 lb)      nad  NCAT  pupils normal size  Skin nml no rashes or lesions  No resp distress, resp even and unlabored   nt, nd,  no rebound no guarding  No cyanosis or clubbing, edema appropriate for pregn  FHT: 150s   Assessment:        1. Encounter for pregnancy related examination in third trimester    2. Polyhydramnios affecting pregnancy                Plan:      Poly noted on growth today   Rescan next week   Encouraged PNV  Pain, fever, bleeding precautions   RTC 1 weeks

## 2025-01-15 DIAGNOSIS — O40.9XX0 POLYHYDRAMNIOS AFFECTING PREGNANCY: Primary | ICD-10-CM

## 2025-01-20 LAB
APPEARANCE, UA: ABNORMAL
BACTERIA SPEC CULT: ABNORMAL /HPF
BILIRUB UR QL STRIP: NEGATIVE MG/DL
COLOR UR: ABNORMAL
GLUCOSE (UA): NORMAL MG/DL
HGB UR QL STRIP: NEGATIVE /UL
KETONES UR QL STRIP: ABNORMAL MG/DL
LEUKOCYTE ESTERASE UR QL STRIP: 500 /UL
NITRITE UR QL STRIP: NEGATIVE
PH UR STRIP: 8 PH (ref 5–8)
PROT UR QL STRIP: 30 MG/DL
RBC #/AREA URNS HPF: ABNORMAL /HPF (ref 0–2)
SP GR UR STRIP: 1.01 (ref 1–1.03)
SPECIMEN COLLECTION METHOD, URINE: ABNORMAL
SQUAMOUS EPITHELIAL, UA: ABNORMAL /LPF
UROBILINOGEN UR STRIP-ACNC: NORMAL MG/DL
WBC #/AREA URNS HPF: ABNORMAL /HPF (ref 0–5)
YEAST URINE: ABNORMAL /HPF

## 2025-01-22 ENCOUNTER — PATIENT MESSAGE (OUTPATIENT)
Dept: OTHER | Facility: OTHER | Age: 30
End: 2025-01-22
Payer: MEDICAID

## 2025-01-23 ENCOUNTER — PATIENT MESSAGE (OUTPATIENT)
Dept: OBSTETRICS AND GYNECOLOGY | Facility: CLINIC | Age: 30
End: 2025-01-23
Payer: MEDICAID

## 2025-01-23 DIAGNOSIS — R39.9 UTI SYMPTOMS: Primary | ICD-10-CM

## 2025-01-24 LAB
ABS NRBC COUNT: 0 X 10 3/UL (ref 0–0.01)
ABSOLUTE BASOPHIL: 0.03 X 10 3/UL (ref 0–0.22)
ABSOLUTE EOSINOPHIL: 0.28 X 10 3/UL (ref 0.04–0.54)
ABSOLUTE IMMATURE GRAN: 0.03 X 10 3/UL (ref 0–0.04)
ABSOLUTE LYMPHOCYTE: 0.89 X 10 3/UL (ref 0.86–4.75)
ABSOLUTE MONOCYTE: 0.51 X 10 3/UL (ref 0.22–1.08)
BASOPHILS NFR BLD: 0.5 % (ref 0.2–1.2)
EOSINOPHIL NFR BLD: 4.3 % (ref 0.7–7)
HCT VFR BLD AUTO: 29.9 % (ref 37–47)
HGB BLD-MCNC: 9.3 G/DL (ref 12–16)
IMMATURE GRANULOCYTES: 0.5 % (ref 0–0.5)
LYMPHOCYTES NFR BLD: 13.6 % (ref 19.3–53.1)
MCH RBC QN AUTO: 25.1 PG (ref 27–32)
MCHC RBC AUTO-ENTMCNC: 31.1 G/DL (ref 32–36)
MCV RBC AUTO: 80.8 FL (ref 82–100)
MONOCYTES NFR BLD: 7.8 % (ref 4.7–12.5)
NEUTROPHILS # BLD AUTO: 4.82 X 10 3/UL (ref 2.15–7.56)
NEUTROPHILS NFR BLD: 73.3 % (ref 34–71.1)
NUCLEATED RED BLOOD CELLS: 0 /100 WBC (ref 0–0.2)
PLATELET # BLD AUTO: 260 X 10 3/UL (ref 135–400)
RBC # BLD AUTO: 3.7 X 10 6/UL (ref 4.2–5.4)
RDW-SD: 38.1 FL (ref 37–54)
WBC # BLD: 6.56 X 10 3/UL (ref 4.3–10.8)

## 2025-01-26 LAB
AMORPH URATE CRY URNS QL MICRO: NEGATIVE
BACTERIA #/AREA URNS HPF: ABNORMAL /[HPF]
BILIRUB UR QL STRIP: NEGATIVE
CLARITY UR: ABNORMAL
COLOR UR: YELLOW
EPITHELIAL CELLS: ABNORMAL
GLUCOSE (UA): NEGATIVE MG/DL
KETONES UR QL STRIP: NEGATIVE MG/DL
LEUKOCYTE ESTERASE UR QL STRIP: ABNORMAL
MUCOUS THREADS URNS QL MICRO: NEGATIVE
NITRITE UR QL STRIP: NEGATIVE
OCCULT BLOOD: NEGATIVE
PH, URINE: 7 (ref 5–7.5)
PROT UR QL STRIP: NEGATIVE MG/DL
RBC/HPF: ABNORMAL
SP GR UR STRIP: 1 (ref 1–1.03)
URINE CULTURE, ROUTINE: NORMAL
UROBILINOGEN, URINE: NORMAL E.U./DL (ref 0–1)
WBC/HPF: ABNORMAL

## 2025-01-29 DIAGNOSIS — R05.1 ACUTE COUGH: Primary | ICD-10-CM

## 2025-01-29 RX ORDER — PROMETHAZINE HYDROCHLORIDE AND DEXTROMETHORPHAN HYDROBROMIDE 6.25; 15 MG/5ML; MG/5ML
5 SYRUP ORAL EVERY 4 HOURS PRN
Qty: 118 ML | Refills: 0 | Status: SHIPPED | OUTPATIENT
Start: 2025-01-29 | End: 2025-02-08

## 2025-01-31 ENCOUNTER — ROUTINE PRENATAL (OUTPATIENT)
Dept: OBSTETRICS AND GYNECOLOGY | Facility: CLINIC | Age: 30
End: 2025-01-31
Payer: MEDICAID

## 2025-01-31 ENCOUNTER — PROCEDURE VISIT (OUTPATIENT)
Dept: OBSTETRICS AND GYNECOLOGY | Facility: CLINIC | Age: 30
End: 2025-01-31
Payer: MEDICAID

## 2025-01-31 VITALS
SYSTOLIC BLOOD PRESSURE: 102 MMHG | WEIGHT: 160 LBS | DIASTOLIC BLOOD PRESSURE: 68 MMHG | HEART RATE: 92 BPM | BODY MASS INDEX: 31.25 KG/M2

## 2025-01-31 DIAGNOSIS — O40.9XX0 POLYHYDRAMNIOS AFFECTING PREGNANCY: Primary | ICD-10-CM

## 2025-01-31 DIAGNOSIS — O40.9XX0 POLYHYDRAMNIOS AFFECTING PREGNANCY: ICD-10-CM

## 2025-01-31 PROCEDURE — 76819 FETAL BIOPHYS PROFIL W/O NST: CPT | Mod: 26,S$PBB,, | Performed by: OBSTETRICS & GYNECOLOGY

## 2025-01-31 PROCEDURE — 99213 OFFICE O/P EST LOW 20 MIN: CPT | Mod: TH,S$PBB,, | Performed by: OBSTETRICS & GYNECOLOGY

## 2025-01-31 NOTE — PROGRESS NOTES
Subjective:       Patient ID: Ricarda Daugherty is a 30 y.o.  at 30w6d       Chief Complaint:  Routine Prenatal Visit      History of Present Illness  No complaints. Labs and history reviewed with pt.         Review of Systems  Denies n/v, f/c, dysuria, contractions,   VD, VB, round ligament pain, headaches       Objective:     Vitals:    25 0839   BP: 102/68   Pulse: 92     Wt Readings from Last 3 Encounters:   10/03/24 67.8 kg (149 lb 6.4 oz)   24 66.7 kg (147 lb)   23 74.8 kg (165 lb)      nad  NCAT  pupils normal size  Skin nml no rashes or lesions  No resp distress, resp even and unlabored   nt, nd,  no rebound no guarding  No cyanosis or clubbing, edema appropriate for pregn  FHT: 150s   Assessment:        No diagnosis found.              Plan:      AFSANEH 25cm   Bpp  continue weekly testing  Rescan next week   Encouraged PNV  Pain, fever, bleeding precautions   RTC 1 weeks

## 2025-02-04 LAB — A1 MICROGLOB PLACENTAL VAG QL: NORMAL

## 2025-02-05 DIAGNOSIS — O40.9XX0 POLYHYDRAMNIOS AFFECTING PREGNANCY: Primary | ICD-10-CM

## 2025-02-07 ENCOUNTER — PROCEDURE VISIT (OUTPATIENT)
Dept: OBSTETRICS AND GYNECOLOGY | Facility: CLINIC | Age: 30
End: 2025-02-07
Payer: MEDICAID

## 2025-02-07 ENCOUNTER — ROUTINE PRENATAL (OUTPATIENT)
Dept: OBSTETRICS AND GYNECOLOGY | Facility: CLINIC | Age: 30
End: 2025-02-07
Payer: MEDICAID

## 2025-02-07 VITALS
WEIGHT: 160 LBS | HEART RATE: 96 BPM | BODY MASS INDEX: 31.25 KG/M2 | DIASTOLIC BLOOD PRESSURE: 65 MMHG | SYSTOLIC BLOOD PRESSURE: 94 MMHG

## 2025-02-07 DIAGNOSIS — O40.9XX0 POLYHYDRAMNIOS AFFECTING PREGNANCY: ICD-10-CM

## 2025-02-07 DIAGNOSIS — Z34.83 ENCOUNTER FOR SUPERVISION OF NORMAL PREGNANCY IN MULTIGRAVIDA IN THIRD TRIMESTER: Primary | ICD-10-CM

## 2025-02-07 PROCEDURE — 76819 FETAL BIOPHYS PROFIL W/O NST: CPT | Mod: 26,S$PBB,, | Performed by: OBSTETRICS & GYNECOLOGY

## 2025-02-07 PROCEDURE — 99214 OFFICE O/P EST MOD 30 MIN: CPT | Mod: TH,S$PBB,, | Performed by: OBSTETRICS & GYNECOLOGY

## 2025-02-07 NOTE — PROGRESS NOTES
Subjective:       Patient ID: Ricarda Daugherty is a 30 y.o.  at 34w2d        Chief Complaint:  Routine Prenatal Visit      History of Present Illness  No complaints. Labs and history reviewed with pt.         Review of Systems  Denies n/v, f/c, dysuria, contractions,   VD, VB, round ligament pain, headaches       Objective:     Vitals:    25 1022   BP: 94/65   Pulse: 96     Wt Readings from Last 3 Encounters:   10/03/24 67.8 kg (149 lb 6.4 oz)   24 66.7 kg (147 lb)   23 74.8 kg (165 lb)      nad  NCAT  pupils normal size  Skin nml no rashes or lesions  No resp distress, resp even and unlabored   nt, nd,  no rebound no guarding  No cyanosis or clubbing, edema appropriate for pregn  FHT: 150s   Assessment:        1. Encounter for supervision of normal pregnancy in multigravida in third trimester                  Plan:      AFSANEH 23cm   Bpp  continue weekly testing  Rescan next week   Encouraged PNV  Pain, fever, bleeding precautions   RTC 1 weeks

## 2025-02-12 ENCOUNTER — PATIENT MESSAGE (OUTPATIENT)
Dept: OTHER | Facility: OTHER | Age: 30
End: 2025-02-12
Payer: MEDICAID

## 2025-02-12 DIAGNOSIS — O40.9XX0 POLYHYDRAMNIOS AFFECTING PREGNANCY: Primary | ICD-10-CM

## 2025-02-14 ENCOUNTER — ROUTINE PRENATAL (OUTPATIENT)
Dept: OBSTETRICS AND GYNECOLOGY | Facility: CLINIC | Age: 30
End: 2025-02-14
Payer: MEDICAID

## 2025-02-14 ENCOUNTER — PROCEDURE VISIT (OUTPATIENT)
Dept: OBSTETRICS AND GYNECOLOGY | Facility: CLINIC | Age: 30
End: 2025-02-14
Payer: MEDICAID

## 2025-02-14 VITALS
WEIGHT: 161.38 LBS | SYSTOLIC BLOOD PRESSURE: 112 MMHG | HEART RATE: 97 BPM | DIASTOLIC BLOOD PRESSURE: 72 MMHG | BODY MASS INDEX: 31.52 KG/M2

## 2025-02-14 DIAGNOSIS — O40.9XX0 POLYHYDRAMNIOS AFFECTING PREGNANCY: ICD-10-CM

## 2025-02-14 DIAGNOSIS — Z34.83 ENCOUNTER FOR SUPERVISION OF NORMAL PREGNANCY IN MULTIGRAVIDA IN THIRD TRIMESTER: Primary | ICD-10-CM

## 2025-02-14 LAB
APPEARANCE, UA: ABNORMAL
BACTERIA SPEC CULT: ABNORMAL /HPF
BILIRUB UR QL STRIP: NEGATIVE MG/DL
COLOR UR: ABNORMAL
GLUCOSE (UA): NORMAL MG/DL
HGB UR QL STRIP: 25 /UL
KETONES UR QL STRIP: 150 MG/DL
LEUKOCYTE ESTERASE UR QL STRIP: 500 /UL
MUCUS URINE: ABNORMAL /LPF
NITRITE UR QL STRIP: NEGATIVE
PH UR STRIP: 7 PH (ref 5–8)
PROT UR QL STRIP: 30 MG/DL
RBC #/AREA URNS HPF: ABNORMAL /HPF (ref 0–2)
SP GR UR STRIP: 1.01 (ref 1–1.03)
SPECIMEN COLLECTION METHOD, URINE: ABNORMAL
SQUAMOUS EPITHELIAL, UA: ABNORMAL /LPF
UROBILINOGEN UR STRIP-ACNC: NORMAL MG/DL
WBC #/AREA URNS HPF: ABNORMAL /HPF (ref 0–5)

## 2025-02-14 NOTE — PROGRESS NOTES
Subjective:       Patient ID: Ricarda Daugherty is a 30 y.o.  at 35w2d         Chief Complaint:  Routine Prenatal Visit      History of Present Illness  No complaints. Labs and history reviewed with pt.         Review of Systems  Denies n/v, f/c, dysuria, contractions,   VD, VB, round ligament pain, headaches       Objective:     Vitals:    25 1005   BP: 112/72   Pulse: 97     Wt Readings from Last 3 Encounters:   10/03/24 67.8 kg (149 lb 6.4 oz)   24 66.7 kg (147 lb)   23 74.8 kg (165 lb)      nad  NCAT  pupils normal size  Skin nml no rashes or lesions  No resp distress, resp even and unlabored   nt, nd,  no rebound no guarding  No cyanosis or clubbing, edema appropriate for pregn  FHT: 150s   Assessment:        1. Encounter for supervision of normal pregnancy in multigravida in third trimester                  Plan:      Cvx: 3/t/h   AFSANEH 21cm   Bpp 8/8 continue weekly testing  Rescan next week   Encouraged PNV  Pain, fever, bleeding precautions   RTC 1 weeks               Asc Procedure Text (A): After obtaining clear surgical margins the patient was sent to an ASC for surgical repair.  The patient understands they will receive post-surgical care and follow-up from the ASC physician.

## 2025-02-15 ENCOUNTER — OUTSIDE PLACE OF SERVICE (OUTPATIENT)
Dept: OBSTETRICS AND GYNECOLOGY | Facility: CLINIC | Age: 30
End: 2025-02-15
Payer: MEDICAID

## 2025-02-17 ENCOUNTER — OUTSIDE PLACE OF SERVICE (OUTPATIENT)
Dept: OBSTETRICS AND GYNECOLOGY | Facility: CLINIC | Age: 30
End: 2025-02-17
Payer: MEDICAID

## 2025-08-27 ENCOUNTER — PATIENT MESSAGE (OUTPATIENT)
Dept: OBSTETRICS AND GYNECOLOGY | Facility: CLINIC | Age: 30
End: 2025-08-27
Payer: MEDICAID